# Patient Record
Sex: FEMALE | Race: WHITE | NOT HISPANIC OR LATINO
[De-identification: names, ages, dates, MRNs, and addresses within clinical notes are randomized per-mention and may not be internally consistent; named-entity substitution may affect disease eponyms.]

---

## 2020-11-25 PROBLEM — Z00.00 ENCOUNTER FOR PREVENTIVE HEALTH EXAMINATION: Status: ACTIVE | Noted: 2020-11-25

## 2020-11-29 DIAGNOSIS — Z86.79 PERSONAL HISTORY OF OTHER DISEASES OF THE CIRCULATORY SYSTEM: ICD-10-CM

## 2020-11-29 DIAGNOSIS — I50.9 HEART FAILURE, UNSPECIFIED: ICD-10-CM

## 2020-11-29 DIAGNOSIS — I83.93 ASYMPTOMATIC VARICOSE VEINS OF BILATERAL LOWER EXTREMITIES: ICD-10-CM

## 2020-11-29 DIAGNOSIS — Z86.39 PERSONAL HISTORY OF OTHER ENDOCRINE, NUTRITIONAL AND METABOLIC DISEASE: ICD-10-CM

## 2020-11-29 DIAGNOSIS — I49.9 CARDIAC ARRHYTHMIA, UNSPECIFIED: ICD-10-CM

## 2020-11-29 DIAGNOSIS — Z83.3 FAMILY HISTORY OF DIABETES MELLITUS: ICD-10-CM

## 2020-11-29 DIAGNOSIS — I35.9 NONRHEUMATIC AORTIC VALVE DISORDER, UNSPECIFIED: ICD-10-CM

## 2020-12-01 ENCOUNTER — APPOINTMENT (OUTPATIENT)
Dept: CARDIOTHORACIC SURGERY | Facility: CLINIC | Age: 85
End: 2020-12-01
Payer: MEDICARE

## 2020-12-01 ENCOUNTER — NON-APPOINTMENT (OUTPATIENT)
Age: 85
End: 2020-12-01

## 2020-12-01 VITALS
RESPIRATION RATE: 15 BRPM | OXYGEN SATURATION: 95 % | SYSTOLIC BLOOD PRESSURE: 109 MMHG | BODY MASS INDEX: 36.07 KG/M2 | HEIGHT: 61.81 IN | DIASTOLIC BLOOD PRESSURE: 68 MMHG | TEMPERATURE: 98 F | WEIGHT: 195.99 LBS

## 2020-12-01 DIAGNOSIS — R06.02 SHORTNESS OF BREATH: ICD-10-CM

## 2020-12-01 DIAGNOSIS — R73.03 PREDIABETES.: ICD-10-CM

## 2020-12-01 DIAGNOSIS — Z63.4 DISAPPEARANCE AND DEATH OF FAMILY MEMBER: ICD-10-CM

## 2020-12-01 DIAGNOSIS — R53.83 OTHER FATIGUE: ICD-10-CM

## 2020-12-01 PROCEDURE — 99072 ADDL SUPL MATRL&STAF TM PHE: CPT

## 2020-12-01 PROCEDURE — 99205 OFFICE O/P NEW HI 60 MIN: CPT

## 2020-12-01 PROCEDURE — 93000 ELECTROCARDIOGRAM COMPLETE: CPT

## 2020-12-01 SDOH — SOCIAL STABILITY - SOCIAL INSECURITY: DISSAPEARANCE AND DEATH OF FAMILY MEMBER: Z63.4

## 2020-12-01 NOTE — HISTORY OF PRESENT ILLNESS
[FreeTextEntry1] : Mrs. Madera is a 89 year old female referred by Dr. Ocampo for evaluation of aortic stenosis. She reports a one year progression of fatigue, exertional dyspnea, LE edema and exertional palpitations which have worsened over the past month (NYHA II). She lives in [facility], but has been in [place] with her daughter for the past year due to [employer] travel restrictions. She has no angina, PND, orthopnea, dizziness, or syncope. Her past medical history includes HTN, T2DM, carotid disease, and CAD s/p PCI to LAD in March 2015. She has chronic LE edema (L>R), but notes it is slightly worse than usual. \par \par She had a recent episode of orthopnea that "didn't last long" but was very concerning to her and her daughter. Her daughter notes that "she gets tired easily" even when walking around the house. Review of her outpatient echocardiogram with Dr Peterson (Director of Structural Heart Echocardiogram) demonstrates severe aortic stenosis, hyperdynamic LV function, NEELIMA 0.9, mGr 57, pGr 91, AoV 4.3, and moderate MR. No prior history of CAD, MI, stents, CVA, or prior cardiac surgery.

## 2020-12-01 NOTE — REVIEW OF SYSTEMS
[Feeling Fatigued] : feeling fatigued [Eyeglasses] : currently wearing eyeglasses [Dyspnea on exertion] : dyspnea during exertion [Lower Ext Edema] : lower extremity edema [Incontinence] : incontinence [Negative] : Heme/Lymph [Fever] : no fever [Chills] : no chills [Shortness Of Breath] : no shortness of breath [Abdominal Pain] : no abdominal pain [Nausea] : no nausea [Heartburn] : no heartburn [Change in Appetite] : no change in appetite [Dizziness] : no dizziness [Confusion] : no confusion was observed [Anxiety] : no anxiety

## 2020-12-01 NOTE — PHYSICAL EXAM
[General Appearance - Well Developed] : well developed [Normal Appearance] : normal appearance [General Appearance - Well Nourished] : well nourished [Normal Conjunctiva] : the conjunctiva exhibited no abnormalities [Normal Oral Mucosa] : normal oral mucosa [Normal Jugular Venous A Waves Present] : normal jugular venous A waves present [Normal Jugular Venous V Waves Present] : normal jugular venous V waves present [No Jugular Venous Painting A Waves] : no jugular venous painting A waves [Normal Rate] : normal [Rhythm Regular] : regular [Nonpitting Edema] : nonpitting edema present [Respiration, Rhythm And Depth] : normal respiratory rhythm and effort [Decreased Breath Sounds] : decreased breath sounds [Bowel Sounds] : normal bowel sounds [Abdomen Soft] : soft [Abnormal Walk] : normal gait [Cyanosis, Localized] : no localized cyanosis [Skin Turgor] : normal skin turgor [] : no rash [Oriented To Time, Place, And Person] : oriented to person, place, and time [Impaired Insight] : insight and judgment were intact [Affect] : the affect was normal [IV] : a grade 4

## 2020-12-01 NOTE — HISTORY OF PRESENT ILLNESS
[FreeTextEntry1] : Tia is a 89 year old female with PMH of anemia, arrhythmia, HTN, ASHD,  carotid stenosis, MR, varicose veins, and aortic valve stenosis. Recent echocardiogram demonstrated AoV 4.4 m/sec, pGr 77 mmHg, mGr 49 mmHg, NEELIMA 0.76 cm². She was referred to valve clinic for consideration for CLAUDIA. She lives with her family, her daughter reports patient became short of breath while lying bed. She is fatigued. \par \par

## 2020-12-01 NOTE — CONSULT LETTER
[FreeTextEntry2] : Neal Ocampo, \par [address]\par [address]\[place] [address] [FreeTextEntry3] : Aman Sanders MD\par \par Cardiovascular & Thoracic Surgery\par Professor\par [facility]\par \par

## 2020-12-01 NOTE — PHYSICAL EXAM
[General Appearance - Alert] : alert [General Appearance - In No Acute Distress] : in no acute distress [Sclera] : the sclera and conjunctiva were normal [PERRL With Normal Accommodation] : pupils were equal in size, round, and reactive to light [Extraocular Movements] : extraocular movements were intact [Outer Ear] : the ears and nose were normal in appearance [Oropharynx] : the oropharynx was normal [Jugular Venous Distention Increased] : there was no jugular-venous distention [Respiration, Rhythm And Depth] : normal respiratory rhythm and effort [Auscultation Breath Sounds / Voice Sounds] : lungs were clear to auscultation bilaterally [III] : a grade 3 [___ +] : bilateral [unfilled]U+ pretibial pitting edema [Rt] : varicose veins of the right leg noted [Lt] : varicose veins of the left leg noted [Examination Of The Chest] : the chest was normal in appearance [Breast Appearance] : normal in appearance [Bowel Sounds] : normal bowel sounds [Abdomen Soft] : soft [Cervical Lymph Nodes Enlarged Posterior Bilaterally] : posterior cervical [Cervical Lymph Nodes Enlarged Anterior Bilaterally] : anterior cervical [No CVA Tenderness] : no ~M costovertebral angle tenderness [Involuntary Movements] : no involuntary movements were seen [Skin Color & Pigmentation] : normal skin color and pigmentation [No Focal Deficits] : no focal deficits [Oriented To Time, Place, And Person] : oriented to person, place, and time [Impaired Insight] : insight and judgment were intact [Right Carotid Bruit] : no bruit heard over the right carotid [Left Carotid Bruit] : no bruit heard over the left carotid

## 2020-12-01 NOTE — DATA REVIEWED
[FreeTextEntry1] : Recent echocardiogram demonstrated AoV 4.4 m/sec, pGr 77 mmHg, mGr 49 mmHg, NEELIMA 0.76 cm².

## 2020-12-01 NOTE — DISCUSSION/SUMMARY
[Severe Aortic Stenosis] : severe aortic stenosis [Deteriorating] : deteriorating [Multidetector Cardiac CT] : multidetector cardiac CT [Coronary Artery Disease] : coronary artery disease [Cardiac Catheterization] : cardiac catheterization [Coronary Angiography] : coronary angiography [None] : none [Possible Stent Placement] : possible stent placement [Patient] : the patient [Family] : the patient's family [FreeTextEntry1] : Mrs Madera has severe aortic stenosis with recent symptomatic progression (NYHA II). I am in agreement with Dr Sanders that she is an appropriate candidate for TAVR consideration. She had concerns regarding her age, but I explained to her and her daughter that she is mentally sharp, active and functional, and living independently with excellent family support, which are all favorable. As such, she will be scheduled for a cardiac structural CT and cardiac catheterization. Once completed, all imaging will be reviewed by the Hear Team and an optimal treatment strategy recommended. I have discussed the potential risks and benefits of TAVR with her and her daughter in detail, including but not limited to death, stroke, the potential need for a PPM, and vascular complication. The goals would be to improve her QOL, functional status, and prolong survival.

## 2020-12-01 NOTE — ASSESSMENT
[FreeTextEntry1] : Tia is a 89 year old female with PMH of anemia, arrhythmia, HTN, ASHD,  carotid stenosis, MR, varicose veins, and aortic valve stenosis. Recent echocardiogram demonstrated AoV 4.4 m/sec, pGr 77 mmHg, mGr 49 mmHg, NEELIMA 0.76 cm². She was referred to valve clinic for consideration for CLAUDIA. She ambulates with a cane and a rollator. Her appetite is fair and her bowel habits are normal. \par \par Critical symptomatic AS this patient is a candidate for a TAVR. Will require catheterization and CTA and then she will be offered a surgery date.

## 2020-12-01 NOTE — REVIEW OF SYSTEMS
[Feeling Tired] : feeling tired [Loss Of Hearing] : hearing loss [Lower Ext Edema] : lower extremity edema [SOB on Exertion] : shortness of breath during exertion [Joint Pain] : joint pain [Joint Swelling] : joint swelling [Negative] : Endocrine [Chest Pain] : no chest pain [Palpitations] : no palpitations [Dizziness] : no dizziness [Fainting] : no fainting

## 2020-12-29 ENCOUNTER — FORM ENCOUNTER (OUTPATIENT)
Age: 85
End: 2020-12-29

## 2020-12-30 ENCOUNTER — OUTPATIENT (OUTPATIENT)
Dept: OUTPATIENT SERVICES | Facility: HOSPITAL | Age: 85
LOS: 1 days | End: 2020-12-30
Payer: MEDICARE

## 2020-12-30 VITALS
TEMPERATURE: 98 F | HEIGHT: 64 IN | SYSTOLIC BLOOD PRESSURE: 169 MMHG | WEIGHT: 169.09 LBS | DIASTOLIC BLOOD PRESSURE: 73 MMHG | HEART RATE: 65 BPM | RESPIRATION RATE: 20 BRPM | OXYGEN SATURATION: 97 %

## 2020-12-30 VITALS
OXYGEN SATURATION: 98 % | DIASTOLIC BLOOD PRESSURE: 65 MMHG | SYSTOLIC BLOOD PRESSURE: 145 MMHG | RESPIRATION RATE: 17 BRPM | HEART RATE: 58 BPM

## 2020-12-30 DIAGNOSIS — I35.9 NONRHEUMATIC AORTIC VALVE DISORDER, UNSPECIFIED: ICD-10-CM

## 2020-12-30 DIAGNOSIS — Z96.60 PRESENCE OF UNSPECIFIED ORTHOPEDIC JOINT IMPLANT: Chronic | ICD-10-CM

## 2020-12-30 LAB
ANION GAP SERPL CALC-SCNC: 12 MMOL/L — SIGNIFICANT CHANGE UP (ref 5–17)
BUN SERPL-MCNC: 54 MG/DL — HIGH (ref 7–23)
CALCIUM SERPL-MCNC: 9.3 MG/DL — SIGNIFICANT CHANGE UP (ref 8.4–10.5)
CHLORIDE SERPL-SCNC: 101 MMOL/L — SIGNIFICANT CHANGE UP (ref 96–108)
CO2 SERPL-SCNC: 26 MMOL/L — SIGNIFICANT CHANGE UP (ref 22–31)
CREAT SERPL-MCNC: 1.46 MG/DL — HIGH (ref 0.5–1.3)
GLUCOSE SERPL-MCNC: 142 MG/DL — HIGH (ref 70–99)
HCT VFR BLD CALC: 30.2 % — LOW (ref 34.5–45)
HGB BLD-MCNC: 9.3 G/DL — LOW (ref 11.5–15.5)
MCHC RBC-ENTMCNC: 28.6 PG — SIGNIFICANT CHANGE UP (ref 27–34)
MCHC RBC-ENTMCNC: 30.8 GM/DL — LOW (ref 32–36)
MCV RBC AUTO: 92.9 FL — SIGNIFICANT CHANGE UP (ref 80–100)
NRBC # BLD: 0 /100 WBCS — SIGNIFICANT CHANGE UP (ref 0–0)
PLATELET # BLD AUTO: 168 K/UL — SIGNIFICANT CHANGE UP (ref 150–400)
POTASSIUM SERPL-MCNC: 4.5 MMOL/L — SIGNIFICANT CHANGE UP (ref 3.5–5.3)
POTASSIUM SERPL-SCNC: 4.5 MMOL/L — SIGNIFICANT CHANGE UP (ref 3.5–5.3)
RBC # BLD: 3.25 M/UL — LOW (ref 3.8–5.2)
RBC # FLD: 12.4 % — SIGNIFICANT CHANGE UP (ref 10.3–14.5)
SODIUM SERPL-SCNC: 139 MMOL/L — SIGNIFICANT CHANGE UP (ref 135–145)
WBC # BLD: 5.45 K/UL — SIGNIFICANT CHANGE UP (ref 3.8–10.5)
WBC # FLD AUTO: 5.45 K/UL — SIGNIFICANT CHANGE UP (ref 3.8–10.5)

## 2020-12-30 PROCEDURE — 93005 ELECTROCARDIOGRAM TRACING: CPT

## 2020-12-30 PROCEDURE — 99152 MOD SED SAME PHYS/QHP 5/>YRS: CPT

## 2020-12-30 PROCEDURE — C1887: CPT

## 2020-12-30 PROCEDURE — 93456 R HRT CORONARY ARTERY ANGIO: CPT

## 2020-12-30 PROCEDURE — 93456 R HRT CORONARY ARTERY ANGIO: CPT | Mod: 26

## 2020-12-30 PROCEDURE — C1769: CPT

## 2020-12-30 PROCEDURE — 80048 BASIC METABOLIC PNL TOTAL CA: CPT

## 2020-12-30 PROCEDURE — 93010 ELECTROCARDIOGRAM REPORT: CPT

## 2020-12-30 PROCEDURE — C1894: CPT

## 2020-12-30 PROCEDURE — 85027 COMPLETE CBC AUTOMATED: CPT

## 2020-12-30 PROCEDURE — 99153 MOD SED SAME PHYS/QHP EA: CPT

## 2020-12-30 RX ORDER — METOPROLOL TARTRATE 50 MG
1 TABLET ORAL
Qty: 30 | Refills: 0
Start: 2020-12-30 | End: 2021-01-28

## 2020-12-30 RX ORDER — MONTELUKAST 4 MG/1
1 TABLET, CHEWABLE ORAL
Qty: 30 | Refills: 0
Start: 2020-12-30 | End: 2021-01-28

## 2020-12-30 RX ORDER — SODIUM CHLORIDE 9 MG/ML
3 INJECTION INTRAMUSCULAR; INTRAVENOUS; SUBCUTANEOUS EVERY 8 HOURS
Refills: 0 | Status: DISCONTINUED | OUTPATIENT
Start: 2020-12-30 | End: 2021-01-13

## 2020-12-30 NOTE — ASU DISCHARGE PLAN (ADULT/PEDIATRIC) - CARE PROVIDER_API CALL
Merlin Ulrich J  INTERVENTIONAL CARDIOLOGY  [address] [place] [address]  Phone: [phone]  Fax: [phone]  Follow Up Time: 2 weeks

## 2020-12-30 NOTE — ASU DISCHARGE PLAN (ADULT/PEDIATRIC) - ASU DC SPECIAL INSTRUCTIONSFT
Wound Care:   the day AFTER your procedure remove bandage GENTTLY, and clean using  mild soap and gentle warm, water stream, pat dry. leave OPEN to air. YOU MAY SHOWER   DO NOT apply lotions, creams, ointments, powder, parfumes to your incision site  DO NOT SOAK your site for 1 week ( no baths, no pools, no tubs, etc...)  Check  your groin and /or wirst daily.A small amount of bruising, and soarness are normal    ACTIVITY: for 24 hours   - DO NOT DRIVE  - DO NOT make any important decisions or sign legal documents   - DO NOT operate heavy machinaries   - you may resume sexual activity in 48 hours, unless otherwise instructed by your cardiologist     If your procedure was done through the WRIST: for the NEXT 3DAYS:  - avoid pushing, pulling, with that affected wrist   - avoid repeated movement of that hand and wrist ( eg: typing, hammering)  - DO NOT LIFT anything more than 5 lbs     If your procedure was done through the GROIN: for the NEXT 5 DAYS  - Limit climbing stairs, DO NOT soak in bathtub or pool  - no strenous activities, pushing, pulling, straining  - Do not lift anything 10lbs or heavier     MEDICATION:   take your medications as explained ( see discharge paperwork)   If you received a STENT, you will be taking antiplatelet medications to KEEP YOUR STENT OPEN ( eg: Aspirin, Plavix, Brilinta, Effient, etc).  Take as prescribed DO NOT STOP taking them without consulting with your cardiologist first.     Follow heart healthy diet reccomended by your doctor, , if you smoke STOP SMOKING ( may call [phone] for center of tobacco control if you need assistance)     CALL your doctor to make appointment in 2 WEEKS     ***CALL YOUR DOCTOR***  if you experience: fever, chills, body aches, or severe pain, swelling, redness, heat or yellow discharge at incision site  If you experience Bleeding or excruciating pain at the procedural site, sweliing ( golf ball size) at your procedural site  If you experience CHEST PAIN  If you experience extremity numbness, tingling, temperature change ( of your procedural site)   If you are unable to reach your doctor, you may contact:   -Cardiology Office at [facility] at [phone] or   - [facility] [phone] - [facility] [phone]

## 2021-01-06 ENCOUNTER — RESULT REVIEW (OUTPATIENT)
Age: 86
End: 2021-01-06

## 2021-01-06 ENCOUNTER — APPOINTMENT (OUTPATIENT)
Dept: CARDIOLOGY | Facility: CLINIC | Age: 86
End: 2021-01-06

## 2021-01-06 ENCOUNTER — OUTPATIENT (OUTPATIENT)
Dept: OUTPATIENT SERVICES | Facility: HOSPITAL | Age: 86
LOS: 1 days | End: 2021-01-06
Payer: MEDICARE

## 2021-01-06 DIAGNOSIS — Z96.60 PRESENCE OF UNSPECIFIED ORTHOPEDIC JOINT IMPLANT: Chronic | ICD-10-CM

## 2021-01-06 DIAGNOSIS — I35.9 NONRHEUMATIC AORTIC VALVE DISORDER, UNSPECIFIED: ICD-10-CM

## 2021-01-06 PROCEDURE — 75572 CT HRT W/3D IMAGE: CPT | Mod: 26

## 2021-01-06 PROCEDURE — 75572 CT HRT W/3D IMAGE: CPT

## 2021-01-12 ENCOUNTER — NON-APPOINTMENT (OUTPATIENT)
Age: 86
End: 2021-01-12

## 2021-01-22 ENCOUNTER — NON-APPOINTMENT (OUTPATIENT)
Age: 86
End: 2021-01-22

## 2021-01-22 DIAGNOSIS — Z22.321 CARRIER OR SUSPECTED CARRIER OF METHICILLIN SUSCEPTIBLE STAPHYLOCOCCUS AUREUS: ICD-10-CM

## 2021-01-22 DIAGNOSIS — Z22.322 CARRIER OR SUSPECTED CARRIER OF METHICILLIN RESISTANT STAPHYLOCOCCUS AUREUS: ICD-10-CM

## 2021-01-25 ENCOUNTER — RESULT REVIEW (OUTPATIENT)
Age: 86
End: 2021-01-25

## 2021-01-25 ENCOUNTER — OUTPATIENT (OUTPATIENT)
Dept: OUTPATIENT SERVICES | Facility: HOSPITAL | Age: 86
LOS: 1 days | End: 2021-01-25
Payer: MEDICARE

## 2021-01-25 ENCOUNTER — APPOINTMENT (OUTPATIENT)
Dept: ULTRASOUND IMAGING | Facility: HOSPITAL | Age: 86
End: 2021-01-25

## 2021-01-25 VITALS
DIASTOLIC BLOOD PRESSURE: 75 MMHG | OXYGEN SATURATION: 97 % | HEIGHT: 64 IN | HEART RATE: 64 BPM | SYSTOLIC BLOOD PRESSURE: 149 MMHG | WEIGHT: 195.11 LBS | RESPIRATION RATE: 16 BRPM | TEMPERATURE: 98 F

## 2021-01-25 DIAGNOSIS — I35.0 NONRHEUMATIC AORTIC (VALVE) STENOSIS: ICD-10-CM

## 2021-01-25 DIAGNOSIS — Z79.82 LONG TERM (CURRENT) USE OF ASPIRIN: ICD-10-CM

## 2021-01-25 DIAGNOSIS — Z96.60 PRESENCE OF UNSPECIFIED ORTHOPEDIC JOINT IMPLANT: Chronic | ICD-10-CM

## 2021-01-25 DIAGNOSIS — Z11.52 ENCOUNTER FOR SCREENING FOR COVID-19: ICD-10-CM

## 2021-01-25 DIAGNOSIS — Z90.49 ACQUIRED ABSENCE OF OTHER SPECIFIED PARTS OF DIGESTIVE TRACT: Chronic | ICD-10-CM

## 2021-01-25 DIAGNOSIS — Z01.818 ENCOUNTER FOR OTHER PREPROCEDURAL EXAMINATION: ICD-10-CM

## 2021-01-25 DIAGNOSIS — Z29.9 ENCOUNTER FOR PROPHYLACTIC MEASURES, UNSPECIFIED: ICD-10-CM

## 2021-01-25 LAB
A1C WITH ESTIMATED AVERAGE GLUCOSE RESULT: 7 % — HIGH (ref 4–5.6)
ALBUMIN SERPL ELPH-MCNC: 3.9 G/DL — SIGNIFICANT CHANGE UP (ref 3.3–5)
ALP SERPL-CCNC: 66 U/L — SIGNIFICANT CHANGE UP (ref 40–120)
ALT FLD-CCNC: 15 U/L — SIGNIFICANT CHANGE UP (ref 10–45)
ANION GAP SERPL CALC-SCNC: 11 MMOL/L — SIGNIFICANT CHANGE UP (ref 5–17)
ANION GAP SERPL CALC-SCNC: 12 MMOL/L
AST SERPL-CCNC: 17 U/L — SIGNIFICANT CHANGE UP (ref 10–40)
BILIRUB SERPL-MCNC: 0.4 MG/DL — SIGNIFICANT CHANGE UP (ref 0.2–1.2)
BLD GP AB SCN SERPL QL: NEGATIVE — SIGNIFICANT CHANGE UP
BUN SERPL-MCNC: 44 MG/DL
BUN SERPL-MCNC: 52 MG/DL — HIGH (ref 7–23)
CALCIUM SERPL-MCNC: 9.4 MG/DL
CALCIUM SERPL-MCNC: 9.5 MG/DL — SIGNIFICANT CHANGE UP (ref 8.4–10.5)
CHLORIDE SERPL-SCNC: 98 MMOL/L
CHLORIDE SERPL-SCNC: 99 MMOL/L — SIGNIFICANT CHANGE UP (ref 96–108)
CO2 SERPL-SCNC: 26 MMOL/L — SIGNIFICANT CHANGE UP (ref 22–31)
CO2 SERPL-SCNC: 28 MMOL/L
CREAT SERPL-MCNC: 1.4 MG/DL — HIGH (ref 0.5–1.3)
CREAT SERPL-MCNC: 1.46 MG/DL
ESTIMATED AVERAGE GLUCOSE: 154 MG/DL — HIGH (ref 68–114)
GLUCOSE SERPL-MCNC: 137 MG/DL
GLUCOSE SERPL-MCNC: 157 MG/DL — HIGH (ref 70–99)
HCT VFR BLD CALC: 28.9 % — LOW (ref 34.5–45)
HGB BLD-MCNC: 8.9 G/DL — LOW (ref 11.5–15.5)
MCHC RBC-ENTMCNC: 28.3 PG — SIGNIFICANT CHANGE UP (ref 27–34)
MCHC RBC-ENTMCNC: 30.8 GM/DL — LOW (ref 32–36)
MCV RBC AUTO: 92 FL — SIGNIFICANT CHANGE UP (ref 80–100)
MRSA PCR RESULT.: SIGNIFICANT CHANGE UP
NRBC # BLD: 0 /100 WBCS — SIGNIFICANT CHANGE UP (ref 0–0)
PLATELET # BLD AUTO: 185 K/UL — SIGNIFICANT CHANGE UP (ref 150–400)
POTASSIUM SERPL-MCNC: 4.3 MMOL/L — SIGNIFICANT CHANGE UP (ref 3.5–5.3)
POTASSIUM SERPL-SCNC: 4.3 MMOL/L — SIGNIFICANT CHANGE UP (ref 3.5–5.3)
POTASSIUM SERPL-SCNC: 4.9 MMOL/L
PROT SERPL-MCNC: 6.7 G/DL — SIGNIFICANT CHANGE UP (ref 6–8.3)
RBC # BLD: 3.14 M/UL — LOW (ref 3.8–5.2)
RBC # FLD: 12.3 % — SIGNIFICANT CHANGE UP (ref 10.3–14.5)
RH IG SCN BLD-IMP: POSITIVE — SIGNIFICANT CHANGE UP
S AUREUS DNA NOSE QL NAA+PROBE: SIGNIFICANT CHANGE UP
SARS-COV-2 RNA SPEC QL NAA+PROBE: SIGNIFICANT CHANGE UP
SODIUM SERPL-SCNC: 136 MMOL/L — SIGNIFICANT CHANGE UP (ref 135–145)
SODIUM SERPL-SCNC: 137 MMOL/L
WBC # BLD: 5.9 K/UL — SIGNIFICANT CHANGE UP (ref 3.8–10.5)
WBC # FLD AUTO: 5.9 K/UL — SIGNIFICANT CHANGE UP (ref 3.8–10.5)

## 2021-01-25 PROCEDURE — G0463: CPT

## 2021-01-25 PROCEDURE — C9803: CPT

## 2021-01-25 PROCEDURE — 87641 MR-STAPH DNA AMP PROBE: CPT

## 2021-01-25 PROCEDURE — 86901 BLOOD TYPING SEROLOGIC RH(D): CPT

## 2021-01-25 PROCEDURE — U0003: CPT

## 2021-01-25 PROCEDURE — 86900 BLOOD TYPING SEROLOGIC ABO: CPT

## 2021-01-25 PROCEDURE — 87640 STAPH A DNA AMP PROBE: CPT

## 2021-01-25 PROCEDURE — 80053 COMPREHEN METABOLIC PANEL: CPT

## 2021-01-25 PROCEDURE — 85027 COMPLETE CBC AUTOMATED: CPT

## 2021-01-25 PROCEDURE — U0005: CPT

## 2021-01-25 PROCEDURE — 86850 RBC ANTIBODY SCREEN: CPT

## 2021-01-25 PROCEDURE — 83036 HEMOGLOBIN GLYCOSYLATED A1C: CPT

## 2021-01-25 PROCEDURE — 93880 EXTRACRANIAL BILAT STUDY: CPT

## 2021-01-25 PROCEDURE — 93880 EXTRACRANIAL BILAT STUDY: CPT | Mod: 26

## 2021-01-25 NOTE — H&P PST ADULT - NSICDXPASTMEDICALHX_GEN_ALL_CORE_FT
PAST MEDICAL HISTORY:  Aortic stenosis     Carotid stenosis     Hypertension     Mitral regurgitation      PAST MEDICAL HISTORY:  Aortic stenosis severe    Carotid stenosis     Glaucoma     History of varicose veins of lower extremity     HLD (hyperlipidemia)     Hypertension     Mitral regurgitation

## 2021-01-25 NOTE — H&P PST ADULT - CARDIOVASCULAR SYMPTOMS
occasional/dyspnea on exertion/peripheral edema dyspnea with walking around house/dyspnea on exertion/peripheral edema

## 2021-01-25 NOTE — H&P PST ADULT - NSICDXPROBLEM_GEN_ALL_CORE_FT
PROBLEM DIAGNOSES  Problem: Severe aortic stenosis  Assessment and Plan: TAVR  Carotid Dopplers done 1/25/21  Per CT Surgery - no need for CXR  Stat FS on admsision    Problem: Long term current use of aspirin  Assessment and Plan: Pt will continue aspirin to OR    Problem: Need for prophylactic measure  Assessment and Plan: The Caprini score indicates that this patient is at high risk for a VTE event (score 6 or greater). Surgical patients in this group will benefit from both pharmacologic prophylaxis and intermittent compression devices.  The surgical team will determine the balance between VTE risk and bleeding risk, and other clinical considerations

## 2021-01-25 NOTE — H&P PST ADULT - HISTORY OF PRESENT ILLNESS
89 yo female with h/o anemia, arrythmia, HTN, HLD, ASHD, carotid stenosis, MR and severe aortic stenosis with c/o progressively worsening dyspnea on exertion is scheduled for TAVR on 1/27/2021.    Pt is scheduled for Covid-19 PCR on 1/25/21 at the [facility].

## 2021-01-25 NOTE — H&P PST ADULT - ASSESSMENT
Aortic Stenosis  Aspirin, Long Term Use  CAPRINI VTE 2.0 SCORE [CLOT updated 2019]    AGE RELATED RISK FACTORS                                                       MOBILITY RELATED FACTORS  [ ] Age 41-60 years                                            (1 Point)                    [ ] Bed rest                                                        (1 Point)  [ ] Age: 61-74 years                                           (2 Points)                  [ ] Plaster cast                                                   (2 Points)  [x ] Age= 75 years                                              (3 Points)                    [ ] Bed bound for more than 72 hours                 (2 Points)    DISEASE RELATED RISK FACTORS                                               GENDER SPECIFIC FACTORS  [x ] Edema in the lower extremities                       (1 Point)              [ ] Pregnancy                                                     (1 Point)  [x ] Varicose veins                                               (1 Point)                     [ ] Post-partum < 6 weeks                                   (1 Point)             [ x] BMI > 25 Kg/m2                                            (1 Point)                     [ ] Hormonal therapy  or oral contraception          (1 Point)                 [ ] Sepsis (in the previous month)                        (1 Point)               [ ] History of pregnancy complications                 (1 point)  [ ] Pneumonia or serious lung disease                                               [ ] Unexplained or recurrent                     (1 Point)           (in the previous month)                               (1 Point)  [ ] Abnormal pulmonary function test                     (1 Point)                 SURGERY RELATED RISK FACTORS  [ ] Acute myocardial infarction                              (1 Point)               [ ]  Section                                             (1 Point)  [ ] Congestive heart failure (in the previous month)  (1 Point)      [ ] Minor surgery                                                  (1 Point)   [ ] Inflammatory bowel disease                             (1 Point)               [ ] Arthroscopic surgery                                        (2 Points)  [ ] Central venous access                                      (2 Points)                [x ] General surgery lasting more than 45 minutes (2 points)  [ ] Malignancy- Present or previous                   (2 Points)                [ ] Elective arthroplasty                                         (5 points)    [ ] Stroke (in the previous month)                          (5 Points)                                                                                                                                                           HEMATOLOGY RELATED FACTORS                                                 TRAUMA RELATED RISK FACTORS  [ ] Prior episodes of VTE                                     (3 Points)                [ ] Fracture of the hip, pelvis, or leg                       (5 Points)  [ ] Positive family history for VTE                         (3 Points)             [ ] Acute spinal cord injury (in the previous month)  (5 Points)  [ ] Prothrombin 91723 A                                     (3 Points)               [ ] Paralysis  (less than 1 month)                             (5 Points)  [ ] Factor V Leiden                                             (3 Points)                  [ ] Multiple Trauma within 1 month                        (5 Points)  [ ] Lupus anticoagulants                                     (3 Points)                                                           [ ] Anticardiolipin antibodies                               (3 Points)                                                       [ ] High homocysteine in the blood                      (3 Points)                                             [ ] Other congenital or acquired thrombophilia      (3 Points)                                                [ ] Heparin induced thrombocytopenia                  (3 Points)                                     Total Score [        8  ]

## 2021-01-27 ENCOUNTER — APPOINTMENT (OUTPATIENT)
Dept: CARDIOTHORACIC SURGERY | Facility: HOSPITAL | Age: 86
End: 2021-01-27

## 2021-01-27 ENCOUNTER — INPATIENT (INPATIENT)
Facility: HOSPITAL | Age: 86
LOS: 5 days | Discharge: HOME | DRG: 267 | End: 2021-02-02
Attending: THORACIC SURGERY (CARDIOTHORACIC VASCULAR SURGERY) | Admitting: THORACIC SURGERY (CARDIOTHORACIC VASCULAR SURGERY)
Payer: MEDICARE

## 2021-01-27 VITALS
DIASTOLIC BLOOD PRESSURE: 72 MMHG | HEIGHT: 64 IN | RESPIRATION RATE: 18 BRPM | HEART RATE: 61 BPM | TEMPERATURE: 98 F | SYSTOLIC BLOOD PRESSURE: 147 MMHG | OXYGEN SATURATION: 100 % | WEIGHT: 196.87 LBS

## 2021-01-27 DIAGNOSIS — I35.0 NONRHEUMATIC AORTIC (VALVE) STENOSIS: ICD-10-CM

## 2021-01-27 DIAGNOSIS — Z90.49 ACQUIRED ABSENCE OF OTHER SPECIFIED PARTS OF DIGESTIVE TRACT: Chronic | ICD-10-CM

## 2021-01-27 DIAGNOSIS — Z96.60 PRESENCE OF UNSPECIFIED ORTHOPEDIC JOINT IMPLANT: Chronic | ICD-10-CM

## 2021-01-27 DIAGNOSIS — Z95.2 PRESENCE OF PROSTHETIC HEART VALVE: ICD-10-CM

## 2021-01-27 PROBLEM — E78.5 HYPERLIPIDEMIA, UNSPECIFIED: Chronic | Status: ACTIVE | Noted: 2021-01-25

## 2021-01-27 PROBLEM — Z86.79 PERSONAL HISTORY OF OTHER DISEASES OF THE CIRCULATORY SYSTEM: Chronic | Status: ACTIVE | Noted: 2021-01-25

## 2021-01-27 PROBLEM — H40.9 UNSPECIFIED GLAUCOMA: Chronic | Status: ACTIVE | Noted: 2021-01-25

## 2021-01-27 LAB
APTT BLD: 30.2 SEC — SIGNIFICANT CHANGE UP (ref 27.5–35.5)
BASOPHILS # BLD AUTO: 0.02 K/UL — SIGNIFICANT CHANGE UP (ref 0–0.2)
BASOPHILS NFR BLD AUTO: 0.3 % — SIGNIFICANT CHANGE UP (ref 0–2)
EOSINOPHIL # BLD AUTO: 0.36 K/UL — SIGNIFICANT CHANGE UP (ref 0–0.5)
EOSINOPHIL NFR BLD AUTO: 4.9 % — SIGNIFICANT CHANGE UP (ref 0–6)
GLUCOSE BLDC GLUCOMTR-MCNC: 125 MG/DL — HIGH (ref 70–99)
HCT VFR BLD CALC: 26.6 % — LOW (ref 34.5–45)
HGB BLD-MCNC: 8.5 G/DL — LOW (ref 11.5–15.5)
IMM GRANULOCYTES NFR BLD AUTO: 0.8 % — SIGNIFICANT CHANGE UP (ref 0–1.5)
INR BLD: 1.25 RATIO — HIGH (ref 0.88–1.16)
LYMPHOCYTES # BLD AUTO: 1.13 K/UL — SIGNIFICANT CHANGE UP (ref 1–3.3)
LYMPHOCYTES # BLD AUTO: 15.2 % — SIGNIFICANT CHANGE UP (ref 13–44)
MCHC RBC-ENTMCNC: 28.9 PG — SIGNIFICANT CHANGE UP (ref 27–34)
MCHC RBC-ENTMCNC: 32 GM/DL — SIGNIFICANT CHANGE UP (ref 32–36)
MCV RBC AUTO: 90.5 FL — SIGNIFICANT CHANGE UP (ref 80–100)
MONOCYTES # BLD AUTO: 0.84 K/UL — SIGNIFICANT CHANGE UP (ref 0–0.9)
MONOCYTES NFR BLD AUTO: 11.3 % — SIGNIFICANT CHANGE UP (ref 2–14)
NEUTROPHILS # BLD AUTO: 5.01 K/UL — SIGNIFICANT CHANGE UP (ref 1.8–7.4)
NEUTROPHILS NFR BLD AUTO: 67.5 % — SIGNIFICANT CHANGE UP (ref 43–77)
NRBC # BLD: 0 /100 WBCS — SIGNIFICANT CHANGE UP (ref 0–0)
PLATELET # BLD AUTO: 142 K/UL — LOW (ref 150–400)
PROTHROM AB SERPL-ACNC: 14.8 SEC — HIGH (ref 10.6–13.6)
RBC # BLD: 2.94 M/UL — LOW (ref 3.8–5.2)
RBC # FLD: 12.2 % — SIGNIFICANT CHANGE UP (ref 10.3–14.5)
RH IG SCN BLD-IMP: POSITIVE — SIGNIFICANT CHANGE UP
WBC # BLD: 7.42 K/UL — SIGNIFICANT CHANGE UP (ref 3.8–10.5)
WBC # FLD AUTO: 7.42 K/UL — SIGNIFICANT CHANGE UP (ref 3.8–10.5)

## 2021-01-27 PROCEDURE — 93010 ELECTROCARDIOGRAM REPORT: CPT | Mod: 76

## 2021-01-27 PROCEDURE — 33361 REPLACE AORTIC VALVE PERQ: CPT | Mod: 62,Q0

## 2021-01-27 PROCEDURE — 93306 TTE W/DOPPLER COMPLETE: CPT | Mod: 26,59

## 2021-01-27 PROCEDURE — 99232 SBSQ HOSP IP/OBS MODERATE 35: CPT

## 2021-01-27 RX ORDER — SODIUM CHLORIDE 9 MG/ML
3 INJECTION INTRAMUSCULAR; INTRAVENOUS; SUBCUTANEOUS EVERY 8 HOURS
Refills: 0 | Status: DISCONTINUED | OUTPATIENT
Start: 2021-01-27 | End: 2021-01-27

## 2021-01-27 RX ORDER — LIDOCAINE HCL 20 MG/ML
0.2 VIAL (ML) INJECTION ONCE
Refills: 0 | Status: DISCONTINUED | OUTPATIENT
Start: 2021-01-27 | End: 2021-01-27

## 2021-01-27 RX ORDER — ASPIRIN/CALCIUM CARB/MAGNESIUM 324 MG
81 TABLET ORAL DAILY
Refills: 0 | Status: DISCONTINUED | OUTPATIENT
Start: 2021-01-27 | End: 2021-02-02

## 2021-01-27 RX ORDER — ATORVASTATIN CALCIUM 80 MG/1
20 TABLET, FILM COATED ORAL AT BEDTIME
Refills: 0 | Status: DISCONTINUED | OUTPATIENT
Start: 2021-01-27 | End: 2021-02-02

## 2021-01-27 RX ORDER — LATANOPROST 0.05 MG/ML
1 SOLUTION/ DROPS OPHTHALMIC; TOPICAL AT BEDTIME
Refills: 0 | Status: DISCONTINUED | OUTPATIENT
Start: 2021-01-27 | End: 2021-02-02

## 2021-01-27 RX ORDER — CHLORHEXIDINE GLUCONATE 213 G/1000ML
1 SOLUTION TOPICAL ONCE
Refills: 0 | Status: DISCONTINUED | OUTPATIENT
Start: 2021-01-27 | End: 2021-01-27

## 2021-01-27 RX ORDER — CEFUROXIME AXETIL 250 MG
1500 TABLET ORAL EVERY 8 HOURS
Refills: 0 | Status: COMPLETED | OUTPATIENT
Start: 2021-01-27 | End: 2021-01-28

## 2021-01-27 RX ORDER — CEFUROXIME AXETIL 250 MG
1500 TABLET ORAL ONCE
Refills: 0 | Status: COMPLETED | OUTPATIENT
Start: 2021-01-27 | End: 2021-01-27

## 2021-01-27 RX ADMIN — Medication 100 MILLIGRAM(S): at 22:38

## 2021-01-27 RX ADMIN — ATORVASTATIN CALCIUM 20 MILLIGRAM(S): 80 TABLET, FILM COATED ORAL at 21:38

## 2021-01-27 RX ADMIN — Medication 81 MILLIGRAM(S): at 21:38

## 2021-01-27 NOTE — PROGRESS NOTE ADULT - ASSESSMENT
89 y/o female with h/o anemia, arrythmia, HTN, HLD, ASHD, carotid stenosis, MR and severe aortic stenosis with c/o progressively worsening dyspnea on exertion is scheduled for TAVR on 1/27/2021.    89 y/o female with h/o anemia, arrythmia, HTN, HLD, ASHD, carotid stenosis, MR and severe aortic stenosis with c/o progressively worsening dyspnea on exertion is scheduled for TAVR on 1/27/2021.       1/27 right transfemoral TAVR 29 mm core valve  NL EF, LBBB at baseline, ECG post op: patient dco into 30-40's, Rate increased to 60 and 20 output at that time now currently SR 50 - 60s, pacer settings changed to rate of 40 & output of 20. Pt now transferred to SDU, TVP to remain in place.

## 2021-01-27 NOTE — PROGRESS NOTE ADULT - SUBJECTIVE AND OBJECTIVE BOX
Subjective: Pt states "Hello" denies any CP or SOB. No acute events overnight.     Telemetry:  SR 60 - 80  Vital Signs Last 24 Hrs  T(C): 36.7 (01-27-21 @ 14:25), Max: 36.7 (01-27-21 @ 14:25)  T(F): 98 (01-27-21 @ 14:25), Max: 98 (01-27-21 @ 14:25)  HR: 60 (01-27-21 @ 18:25) (50 - 70)  BP: 154/74 (01-27-21 @ 18:25) (106/54 - 156/69)  RR: 15 (01-27-21 @ 18:25) (15 - 18)  SpO2: 98% (01-27-21 @ 18:25) (97% - 100%)                                 8.5    7.42  )-----------( 142      ( 27 Jan 2021 14:47 )             26.6       POCT Blood Glucose.: 125 mg/dL (27 Jan 2021 10:29)          PHYSICAL EXAM  Neurology: A&Ox3, NAD  CV : RRR+S1S2  Lungs: Respirations non-labored, B/L BS CTA  Abdomen: Soft, NT/ND, +BSx4Q  : Voiding without difficulty  Extremities: B/L LE no edema, negative calf tenderness, +PP                      Left femoral incision with DSD CDI, no bleeding, no hematoma                   Right femoral incision with DSD CDI, +TVP - EPM VVI 40/20                            MEDICATIONS  aspirin enteric coated 81 milliGRAM(s) Oral daily  atorvastatin 20 milliGRAM(s) Oral at bedtime  cefuroxime  IVPB 1500 milliGRAM(s) IV Intermittent every 8 hours  cefuroxime  IVPB 1500 milliGRAM(s) IV Intermittent once  latanoprost 0.005% Ophthalmic Solution 1 Drop(s) Both EYES at bedtime        Discussed with Cardiothoracic Team at AM rounds.

## 2021-01-27 NOTE — BRIEF OPERATIVE NOTE - NSICDXBRIEFPROCEDURE_GEN_ALL_CORE_FT
PROCEDURES:  TAVR, percutaneous 28-Jan-2021 12:59:06 #29 medtronic core evolut pro plus   TVP in place via right femoral venous sheath Kleber Alvarez

## 2021-01-27 NOTE — PROGRESS NOTE ADULT - PROBLEM SELECTOR PLAN 1
Continue asa 81 daily  Check TTE POD 1  Check daily EKG  Maintain right femoral TVP in place  Increase activity as tolerated  Disposition: Plan for home Fri with LISA

## 2021-01-27 NOTE — CHART NOTE - NSCHARTNOTEFT_GEN_A_CORE
s/p right transfemoral TAVR 29 mm core valve  Dx: Severe AS   nL EF  LBBB at baseline    Site:   Left femoral artery 6Fr angioseal  Right femorl vein with stitch and TVP wires (not pacing)  ECG pending    Meds:  ASA only  stop Dig  stop BB  Abx as ordered s/p right transfemoral TAVR 29 mm core valve  Dx: Severe AS   nL EF  LBBB at baseline    Site:   Left femoral artery 6Fr angioseal  Right femoral vein with stitch and TVP wires   ECG post op: patient doc into 30-40's, Rate increased to 60 and 20 output at that time  Pacing wires to remain in place  ECG: SB 50's, pacing changed to rate of 40 output of 20 (MD Ulrich aware)    Meds:  ASA only  stop Dig  stop BB  Abx as ordered    Dispo:  2 lowery step down

## 2021-01-27 NOTE — PRE-ANESTHESIA EVALUATION ADULT - NSANTHPMHFT_GEN_ALL_CORE
89 yo F with pmhx of CAD s/p stents, AS, HTN, anemia, and HLD who presents for TF TAVR. NPO, COVID neg.

## 2021-01-28 ENCOUNTER — FORM ENCOUNTER (OUTPATIENT)
Age: 86
End: 2021-01-28

## 2021-01-28 DIAGNOSIS — I48.91 UNSPECIFIED ATRIAL FIBRILLATION: ICD-10-CM

## 2021-01-28 LAB
ANION GAP SERPL CALC-SCNC: 11 MMOL/L — SIGNIFICANT CHANGE UP (ref 5–17)
BUN SERPL-MCNC: 45 MG/DL — HIGH (ref 7–23)
CALCIUM SERPL-MCNC: 8.9 MG/DL — SIGNIFICANT CHANGE UP (ref 8.4–10.5)
CHLORIDE SERPL-SCNC: 100 MMOL/L — SIGNIFICANT CHANGE UP (ref 96–108)
CO2 SERPL-SCNC: 25 MMOL/L — SIGNIFICANT CHANGE UP (ref 22–31)
CREAT SERPL-MCNC: 1.27 MG/DL — SIGNIFICANT CHANGE UP (ref 0.5–1.3)
GLUCOSE SERPL-MCNC: 140 MG/DL — HIGH (ref 70–99)
HCT VFR BLD CALC: 28.8 % — LOW (ref 34.5–45)
HGB BLD-MCNC: 9 G/DL — LOW (ref 11.5–15.5)
MCHC RBC-ENTMCNC: 28.2 PG — SIGNIFICANT CHANGE UP (ref 27–34)
MCHC RBC-ENTMCNC: 31.3 GM/DL — LOW (ref 32–36)
MCV RBC AUTO: 90.3 FL — SIGNIFICANT CHANGE UP (ref 80–100)
NRBC # BLD: 0 /100 WBCS — SIGNIFICANT CHANGE UP (ref 0–0)
PLATELET # BLD AUTO: 149 K/UL — LOW (ref 150–400)
POTASSIUM SERPL-MCNC: 4.3 MMOL/L — SIGNIFICANT CHANGE UP (ref 3.5–5.3)
POTASSIUM SERPL-SCNC: 4.3 MMOL/L — SIGNIFICANT CHANGE UP (ref 3.5–5.3)
RBC # BLD: 3.19 M/UL — LOW (ref 3.8–5.2)
RBC # FLD: 12.3 % — SIGNIFICANT CHANGE UP (ref 10.3–14.5)
SODIUM SERPL-SCNC: 136 MMOL/L — SIGNIFICANT CHANGE UP (ref 135–145)
WBC # BLD: 7.94 K/UL — SIGNIFICANT CHANGE UP (ref 3.8–10.5)
WBC # FLD AUTO: 7.94 K/UL — SIGNIFICANT CHANGE UP (ref 3.8–10.5)

## 2021-01-28 PROCEDURE — 93010 ELECTROCARDIOGRAM REPORT: CPT

## 2021-01-28 PROCEDURE — 93306 TTE W/DOPPLER COMPLETE: CPT | Mod: 26

## 2021-01-28 PROCEDURE — 99233 SBSQ HOSP IP/OBS HIGH 50: CPT

## 2021-01-28 PROCEDURE — 99232 SBSQ HOSP IP/OBS MODERATE 35: CPT

## 2021-01-28 RX ORDER — DIGOXIN 250 MCG
0.12 TABLET ORAL DAILY
Refills: 0 | Status: DISCONTINUED | OUTPATIENT
Start: 2021-01-28 | End: 2021-01-29

## 2021-01-28 RX ORDER — FUROSEMIDE 40 MG
20 TABLET ORAL DAILY
Refills: 0 | Status: DISCONTINUED | OUTPATIENT
Start: 2021-01-28 | End: 2021-01-30

## 2021-01-28 RX ORDER — METOPROLOL TARTRATE 50 MG
25 TABLET ORAL DAILY
Refills: 0 | Status: DISCONTINUED | OUTPATIENT
Start: 2021-01-28 | End: 2021-01-29

## 2021-01-28 RX ADMIN — Medication 81 MILLIGRAM(S): at 12:21

## 2021-01-28 RX ADMIN — Medication 20 MILLIGRAM(S): at 13:30

## 2021-01-28 RX ADMIN — Medication 0.12 MILLIGRAM(S): at 08:11

## 2021-01-28 RX ADMIN — Medication 100 MILLIGRAM(S): at 06:02

## 2021-01-28 RX ADMIN — Medication 25 MILLIGRAM(S): at 08:11

## 2021-01-28 RX ADMIN — ATORVASTATIN CALCIUM 20 MILLIGRAM(S): 80 TABLET, FILM COATED ORAL at 21:13

## 2021-01-28 NOTE — PROGRESS NOTE ADULT - PROBLEM SELECTOR PLAN 1
Continue asa 81 daily  Check TTE POD 1  Check daily EKG  Maintain right femoral TVP in place  Increase activity as tolerated  Disposition: Plan for home Fri with LISA Continue asa 81 daily  Check daily EKG right femoral TVP in place     plan to remove today  dig  125 mcg   and toprol 25    Disposition: Plan for home Fri with LISA Continue asa 81 daily  Check daily EKG right femoral TVP in place     plan to remove today  dig  125 mcg   and toprol 25  ,  home dose 50 mg  Disposition: Plan for home Fri with LISA

## 2021-01-28 NOTE — PROGRESS NOTE ADULT - SUBJECTIVE AND OBJECTIVE BOX
VITAL SIGNS    Telemetry:      Vital Signs Last 24 Hrs  T(C): 36.4 (01-28-21 @ 03:08), Max: 36.7 (01-27-21 @ 14:25)  T(F): 97.6 (01-28-21 @ 03:08), Max: 98 (01-27-21 @ 14:25)  HR: 79 (01-28-21 @ 03:08) (50 - 79)  BP: 156/67 (01-28-21 @ 03:08) (106/54 - 170/72)  RR: 18 (01-28-21 @ 03:08) (15 - 18)  SpO2: 100% (01-28-21 @ 03:08) (97% - 100%)                   Daily Height in cm: 162.56 (27 Jan 2021 12:00)    Daily         CAPILLARY BLOOD GLUCOSE      POCT Blood Glucose.: 125 mg/dL (27 Jan 2021 10:29)        Pacing Wires        [  ]   Settings:                                  Isolated      Coumadin    [ ] YES          [  ]      NO         Reason:                         PHYSICAL EXAM    Neurology: alert and oriented x 3, moves all extremities with no defecits  CV :  RRR  Sternal Wound :  CDI , Stable  Lungs:   CTA B/L  Abdomen: soft, nontender, nondistended, positive bowel sounds, last bowel movement   Extremities:                                            VITAL SIGNS    Telemetry:  afib   80    Vital Signs Last 24 Hrs  T(C): 36.4 (01-28-21 @ 03:08), Max: 36.7 (01-27-21 @ 14:25)  T(F): 97.6 (01-28-21 @ 03:08), Max: 98 (01-27-21 @ 14:25)  HR: 79 (01-28-21 @ 03:08) (50 - 79)  BP: 156/67 (01-28-21 @ 03:08) (106/54 - 170/72)  RR: 18 (01-28-21 @ 03:08) (15 - 18)  SpO2: 100% (01-28-21 @ 03:08) (97% - 100%)                   Daily Height in cm: 162.56 (27 Jan 2021 12:00)    Daily         CAPILLARY BLOOD GLUCOSE      POCT Blood Glucose.: 125 mg/dL (27 Jan 2021 10:29)           TVP Pacing Wires        [  ]   Settings:                                      PHYSICAL EXAM    Neurology: alert and oriented x 3, moves all extremities with no defecits  CV :  RRR  Sternal Wound :  CDI , Stable  Lungs:   CTA B/L  Abdomen: soft, nontender, nondistended, positive bowel sounds, last bowel movement   Extremities:                                            VITAL SIGNS    Telemetry:  afib   80    Vital Signs Last 24 Hrs  T(C): 36.4 (01-28-21 @ 03:08), Max: 36.7 (01-27-21 @ 14:25)  T(F): 97.6 (01-28-21 @ 03:08), Max: 98 (01-27-21 @ 14:25)  HR: 79 (01-28-21 @ 03:08) (50 - 79)  BP: 156/67 (01-28-21 @ 03:08) (106/54 - 170/72)  RR: 18 (01-28-21 @ 03:08) (15 - 18)  SpO2: 100% (01-28-21 @ 03:08) (97% - 100%)                   Daily Height in cm: 162.56 (27 Jan 2021 12:00)    Daily         CAPILLARY BLOOD GLUCOSE      POCT Blood Glucose.: 125 mg/dL (27 Jan 2021 10:29)           TVP Pacing Wires        [  ]   Settings:                                      PHYSICAL EXAM    Neurology: alert and oriented x 3, moves all extremities with no defecits  CV : I RRR    Lungs:   CTA B/L  Abdomen: soft, nontender, nondistended,   Extremities:     bl  groins benign,   + TVP

## 2021-01-28 NOTE — PROGRESS NOTE ADULT - ASSESSMENT
89 y/o female with h/o anemia, arrythmia, HTN, HLD, ASHD, carotid stenosis, MR and severe aortic stenosis with c/o progressively worsening dyspnea on exertion is scheduled for TAVR on 1/27/2021.       1/27 right transfemoral TAVR 29 mm core valve  NL EF, LBBB at baseline, ECG post op: patient doc into 30-40's, Rate increased to 60 and 20 output at that time now currently SR 50 - 60s, pacer settings changed to rate of 40 & output of 20. Pt now transferred to SDU, TVP to remain in place.  89 y/o female with h/o anemia, arrythmia, HTN, HLD, ASHD, carotid stenosis, MR and severe aortic stenosis with c/o progressively worsening dyspnea on exertion is scheduled for TAVR on 1/27/2021.       1/27 right transfemoral TAVR 29 mm core valve  NL EF, LBBB at baseline, ECG post op: patient doc into 30-40's, Rate increased to 60 and 20 output at that time now currently SR 50 - 60s, pacer settings changed to rate of 40 & output of 20. Pt now transferred to SDU, TVP to remain in place.   1/28   TVP,  started home dog and toprol 25   qd   now in afib  80   TTE done

## 2021-01-28 NOTE — PROGRESS NOTE ADULT - SUBJECTIVE AND OBJECTIVE BOX
*****Structural Heart Team*****    Subjective:    Patient resting comfortably in bed, stating she is uncomfortable, and would like to get out of bed. The patient still has a femoral TVP wire in for new LBBB, which is resolved, and there has been no pacing overnight. She did have an episode of AFib last night.      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          T(C): 36.4 (01-28-21 @ 07:03), Max: 36.7 (01-27-21 @ 14:25)  HR: 81 (01-28-21 @ 07:03) (50 - 81)  BP: 143/68 (01-28-21 @ 07:03) (106/54 - 170/72)  RR: 18 (01-28-21 @ 07:03) (15 - 18)  SpO2: 99% (01-28-21 @ 07:03) (97% - 100%)  Wt(kg): --  01-27 @ 07:01  -  01-28 @ 07:00  --------------------------------------------------------  IN: 450 mL / OUT: 650 mL / NET: -200 mL      MEDICATIONS  (STANDING):  aspirin enteric coated 81 milliGRAM(s) Oral daily  atorvastatin 20 milliGRAM(s) Oral at bedtime  digoxin     Tablet 0.125 milliGRAM(s) Oral daily  latanoprost 0.005% Ophthalmic Solution 1 Drop(s) Both EYES at bedtime  metoprolol succinate ER 25 milliGRAM(s) Oral daily    MEDICATIONS  (PRN):      Review of Symptoms:  Constitutional: Awake, Alert, Follows commands  Respiratory: Mild SOB  Cardiac: Denies CP, Denies Palpitations  Gastrointestinal: Denies Pain, Denies N/V, tolerating po intake  Vascular: Negative  Extremities: No Edema, No joint pain or swelling  Neurological: Negative  Endocrine: No heat or cold intolerance, No excessive thirst  Heme/Onc: Negative    Exam:  General: A/O x3, LUC, Follows commands  HEENT: Supple, No JVD, Trachea midline, no masses  Pulmonary: CTAB, = Chest Excursion, no accessory muscle use  Cor: S1S2, RRR, No murmur or rub noted  ECG: SR with 1st degree AVB  Groin/Wound: Soft, Mildly tender, no hematoma, R Femoral TVP wire in place  Gastrointestinal: Soft, NT/ND, + Bowel Sounds  Neuro: = motor and sensory B/L, No focal deficits  Vascular: 1+ pulses B/L, Trace edema  Extremities: No joint pain or swelling  Skin: Warm/Dry/Normal color, Normal turgor, no rashes                          9.0    7.94  )-----------( 149      ( 28 Jan 2021 05:29 )             28.8   01-28    136  |  100  |  45<H>  ----------------------------<  140<H>  4.3   |  25  |  1.27    Ca    8.9      28 Jan 2021 05:29    PT/INR - ( 27 Jan 2021 14:47 )   PT: 14.8 sec;   INR: 1.25 ratio         PTT - ( 27 Jan 2021 14:47 )  PTT:30.2 sec    Imaging Reviewed:    Post Op TTE: Pending      Assesment/Plan:  89 female s/p TAVR for severe Aortic Stenosis, Chronic Diastolic Heart Failure, p AF  1.) S/P TAVR: ASA 81 mg po daily, Continue to Monitor Groins. Ambulate as tolerated.  Follow up post op TTE  2.) Patient will be discharged on an MCOT for a period of 30days to monitor for rhythm changes post TAVR.  3.) CChronic HF: Monitor daily weight, restarted furosemide 20 mg po daily,  half of patients usual home dose  4.) pAF: Currently in SR. Rate controlled and back on her Digoxin  5.) Would recommend removing TVP wire, and ambulating patient.  6.) Discharge Plan: Patient is to follow up with Dr. Sanders  in 1 week post discharge. She should then follow up with the [facility]  in 30 days, with a repeat Transthoracic Echo to be done at that visit.    BAL Lester  [id number]

## 2021-01-29 ENCOUNTER — TRANSCRIPTION ENCOUNTER (OUTPATIENT)
Age: 86
End: 2021-01-29

## 2021-01-29 LAB
ANION GAP SERPL CALC-SCNC: 13 MMOL/L — SIGNIFICANT CHANGE UP (ref 5–17)
BUN SERPL-MCNC: 48 MG/DL — HIGH (ref 7–23)
CALCIUM SERPL-MCNC: 9 MG/DL — SIGNIFICANT CHANGE UP (ref 8.4–10.5)
CHLORIDE SERPL-SCNC: 98 MMOL/L — SIGNIFICANT CHANGE UP (ref 96–108)
CO2 SERPL-SCNC: 23 MMOL/L — SIGNIFICANT CHANGE UP (ref 22–31)
CREAT SERPL-MCNC: 1.64 MG/DL — HIGH (ref 0.5–1.3)
GLUCOSE SERPL-MCNC: 128 MG/DL — HIGH (ref 70–99)
HCT VFR BLD CALC: 27.6 % — LOW (ref 34.5–45)
HGB BLD-MCNC: 8.6 G/DL — LOW (ref 11.5–15.5)
MCHC RBC-ENTMCNC: 27.9 PG — SIGNIFICANT CHANGE UP (ref 27–34)
MCHC RBC-ENTMCNC: 31.2 GM/DL — LOW (ref 32–36)
MCV RBC AUTO: 89.6 FL — SIGNIFICANT CHANGE UP (ref 80–100)
NRBC # BLD: 0 /100 WBCS — SIGNIFICANT CHANGE UP (ref 0–0)
PLATELET # BLD AUTO: 129 K/UL — LOW (ref 150–400)
POTASSIUM SERPL-MCNC: 4 MMOL/L — SIGNIFICANT CHANGE UP (ref 3.5–5.3)
POTASSIUM SERPL-SCNC: 4 MMOL/L — SIGNIFICANT CHANGE UP (ref 3.5–5.3)
RBC # BLD: 3.08 M/UL — LOW (ref 3.8–5.2)
RBC # FLD: 12.5 % — SIGNIFICANT CHANGE UP (ref 10.3–14.5)
SARS-COV-2 RNA SPEC QL NAA+PROBE: SIGNIFICANT CHANGE UP
SODIUM SERPL-SCNC: 134 MMOL/L — LOW (ref 135–145)
WBC # BLD: 6.84 K/UL — SIGNIFICANT CHANGE UP (ref 3.8–10.5)
WBC # FLD AUTO: 6.84 K/UL — SIGNIFICANT CHANGE UP (ref 3.8–10.5)

## 2021-01-29 PROCEDURE — 93010 ELECTROCARDIOGRAM REPORT: CPT

## 2021-01-29 PROCEDURE — 99232 SBSQ HOSP IP/OBS MODERATE 35: CPT

## 2021-01-29 PROCEDURE — 99233 SBSQ HOSP IP/OBS HIGH 50: CPT

## 2021-01-29 RX ORDER — VALSARTAN 80 MG/1
80 TABLET ORAL DAILY
Refills: 0 | Status: DISCONTINUED | OUTPATIENT
Start: 2021-01-29 | End: 2021-01-30

## 2021-01-29 RX ORDER — METOPROLOL TARTRATE 50 MG
50 TABLET ORAL DAILY
Refills: 0 | Status: DISCONTINUED | OUTPATIENT
Start: 2021-01-29 | End: 2021-02-02

## 2021-01-29 RX ORDER — MUPIROCIN 20 MG/G
2 OINTMENT TOPICAL
Qty: 1 | Refills: 0 | Status: COMPLETED | COMMUNITY
Start: 2021-01-22 | End: 2021-01-29

## 2021-01-29 RX ADMIN — VALSARTAN 80 MILLIGRAM(S): 80 TABLET ORAL at 12:38

## 2021-01-29 RX ADMIN — LATANOPROST 1 DROP(S): 0.05 SOLUTION/ DROPS OPHTHALMIC; TOPICAL at 22:02

## 2021-01-29 RX ADMIN — ATORVASTATIN CALCIUM 20 MILLIGRAM(S): 80 TABLET, FILM COATED ORAL at 22:02

## 2021-01-29 RX ADMIN — Medication 20 MILLIGRAM(S): at 05:10

## 2021-01-29 RX ADMIN — Medication 0.12 MILLIGRAM(S): at 05:10

## 2021-01-29 RX ADMIN — Medication 81 MILLIGRAM(S): at 10:14

## 2021-01-29 RX ADMIN — Medication 25 MILLIGRAM(S): at 05:10

## 2021-01-29 NOTE — DISCHARGE NOTE PROVIDER - NSDCPNSUBOBJ_GEN_ALL_CORE
im ok    VITAL SIGNS    Telemetry:  nsr 60    Vital Signs Last 24 Hrs  T(C): 36.8 (21 @ 10:56), Max: 36.9 (21 @ 07:33)  T(F): 98.2 (21 @ 10:56), Max: 98.4 (21 @ 07:33)  HR: 60 (21 @ 10:56) (60 - 73)  BP: 131/58 (21 @ 10:56) (112/56 - 155/67)  RR: 18 (21 @ 10:56) (18 - 19)  SpO2: 95% (21 @ 10:56) (94% - 100%)                    @ 07:01  -   @ 07:00  --------------------------------------------------------  IN: 1060 mL / OUT: 2900 mL / NET: -1840 mL     @ 07:01  -  02 @ 11:23  --------------------------------------------------------  IN: 240 mL / OUT: 0 mL / NET: 240 mL          Daily     Daily Weight in k.3 (2021 05:54)            CAPILLARY BLOOD GLUCOSE              :    Coumadin                               PHYSICAL EXAM        Neurology: alert and oriented x 3, nonfocal, no gross deficits  CV : s1 s2 RRR  Lungs: cta  Abdomen: soft, nontender, nondistended, positive bowel sounds                  :    voiding  Extremities:     trace edema   /  -   calve tenderness ,    R groin cdi , small hematoma          acetaminophen   Tablet .. 650 milliGRAM(s) Oral every 6 hours PRN  amLODIPine   Tablet 2.5 milliGRAM(s) Oral daily  aspirin enteric coated 81 milliGRAM(s) Oral daily  atorvastatin 20 milliGRAM(s) Oral at bedtime  ceFAZolin   IVPB 2000 milliGRAM(s) IV Intermittent once  latanoprost 0.005% Ophthalmic Solution 1 Drop(s) Both EYES at bedtime  metoprolol succinate ER 50 milliGRAM(s) Oral daily                    Discussed with Cardiothoracic Team at AM rounds.

## 2021-01-29 NOTE — DISCHARGE NOTE PROVIDER - NSDCMRMEDTOKEN_GEN_ALL_CORE_FT
aspirin 81 mg oral delayed release tablet: 1 tab(s) orally once a day  atorvastatin 20 mg oral tablet: 1 tab(s) orally once a day (at bedtime)  digoxin 125 mcg (0.125 mg) oral tablet: 1 tab(s) orally once a day  Diovan HCT 80 mg-12.5 mg oral tablet: 1 tab(s) orally once a day  fluticasone-salmeterol 100 mcg-50 mcg inhalation powder: 1 puff(s) inhaled 2 times a day  furosemide 40 mg oral tablet: 1 tab(s) orally once a day  metoprolol succinate 50 mg oral tablet, extended release: 1 tab(s) orally once a day (at bedtime)  travoprost 0.004% ophthalmic solution: 1 drop(s) to each affected eye once a day (in the evening)   acetaminophen 325 mg oral tablet: 3 tab(s) orally every 8 hours, As Needed  for pain.  Do not exceed 3 doses /day  amLODIPine 2.5 mg oral tablet: 1 tab(s) orally once a day  aspirin 81 mg oral delayed release tablet: 1 tab(s) orally once a day  atorvastatin 20 mg oral tablet: 1 tab(s) orally once a day (at bedtime)  fluticasone-salmeterol 100 mcg-50 mcg inhalation powder: 1 puff(s) inhaled 2 times a day  Metoprolol Succinate ER 50 mg oral tablet, extended release: 1 tab(s) orally once a day   Singulair 10 mg oral tablet: 1 tab(s) orally once a day   travoprost 0.004% ophthalmic solution: 1 drop(s) to each affected eye once a day (in the evening)

## 2021-01-29 NOTE — DISCHARGE NOTE PROVIDER - CARE PROVIDERS DIRECT ADDRESSES
[email or web address]

## 2021-01-29 NOTE — DISCHARGE NOTE PROVIDER - CARE PROVIDER_API CALL
Aman Sanders (MD)  Thoracic and Cardiac Surgery  [address] [place] [address]  Phone: [phone]  Fax: [phone]  Scheduled Appointment: 02/02/2021 01:15 PM    Merlin Ulrich)  Interventional Cardiology  [address] [place] [address]  Phone: [phone]  Fax: [phone]  Follow Up Time:    Merlin Ulrich)  Interventional Cardiology  [address] [place] [address]  Phone: [phone]  Fax: [phone]  Follow Up Time:     Aman Sanders)  Thoracic and Cardiac Surgery  [address] [place] [address]  Phone: [phone]  Fax: [phone]  Follow Up Time:    Merlin Ulrich)  Interventional Cardiology  [address] [place] [address]  Phone: [phone]  Fax: [phone]  Follow Up Time:     Aman Sanders)  Thoracic and Cardiac Surgery  [address] [place] [address]  Phone: [phone]  Fax: [phone]  Scheduled Appointment: 02/09/2021 08:00 AM

## 2021-01-29 NOTE — DISCHARGE NOTE NURSING/CASE MANAGEMENT/SOCIAL WORK - PATIENT PORTAL LINK FT
You can access the FollowMyHealth Patient Portal offered by [facility] by registering at the following website: [email or web address]. By joining [employer]’s FollowMyHealth portal, you will also be able to view your health information using other applications (apps) compatible with our system.

## 2021-01-29 NOTE — PROGRESS NOTE ADULT - SUBJECTIVE AND OBJECTIVE BOX
*****Structural Heart Team*****    Subjective:  Patient resting comfortably in a chair, offering no complaints. There were no events overnight, no episodes of AFib.      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          T(C): 36.8 (01-29-21 @ 03:12), Max: 36.8 (01-28-21 @ 19:56)  HR: 75 (01-29-21 @ 08:09) (75 - 85)  BP: 143/65 (01-29-21 @ 08:09) (131/62 - 175/68)  RR: 18 (01-29-21 @ 08:09) (18 - 18)  SpO2: 94% (01-29-21 @ 08:09) (94% - 97%)  Wt(kg): --  01-28 @ 07:01 - 01-29 @ 07:00  --------------------------------------------------------  IN: 920 mL / OUT: 850 mL / NET: 70 mL    01-29 @ 07:01 - 01-29 @ 10:59  --------------------------------------------------------  IN: 240 mL / OUT: 0 mL / NET: 240 mL      MEDICATIONS  (STANDING):  aspirin enteric coated 81 milliGRAM(s) Oral daily  atorvastatin 20 milliGRAM(s) Oral at bedtime  digoxin     Tablet 0.125 milliGRAM(s) Oral daily  furosemide    Tablet 20 milliGRAM(s) Oral daily  latanoprost 0.005% Ophthalmic Solution 1 Drop(s) Both EYES at bedtime  metoprolol succinate ER 50 milliGRAM(s) Oral daily  valsartan 80 milliGRAM(s) Oral daily    MEDICATIONS  (PRN):      Review of Symptoms:  Constitutional: Awake, Alert, Follows commands  Respiratory: Currently denies  Cardiac: Denies CP, Denies Palpitations  Gastrointestinal: Denies Pain, Denies N/V, tolerating po intake  Vascular: Negative  Extremities: + Edema, No joint pain or swelling  Neurological: Negative  Endocrine: No heat or cold intolerance, No excessive thirst  Heme/Onc: Negative    Exam:  General: A/Ox3, LUC, Follows commands  HEENT: Supple, No JVD, Trachea midline, no masses  Pulmonary: CTAB, = Chest Excursion, no accessory muscle use  Cor: S1S2, RRR, No murmur or rub noted  ECG: SR with 1st degree AVB and LBBB ( yesterday 176 today)  Groin/Wound: Soft, mild tenderness. Minimal ecchymosis. No hematoma  Gastrointestinal: Soft, NT/ND, + Bowel Sounds  Neuro: = motor and sensory B/L, No focal deficits  Vascular: 1+ Pulses B/L, Trace edema  Extremities: No joint pain or swelling  Skin: Warm/Dry/Normal color, Normal turgor, no rashes                          8.6    6.84  )-----------( 129      ( 29 Jan 2021 04:38 )             27.6   01-29    134<L>  |  98  |  48<H>  ----------------------------<  128<H>  4.0   |  23  |  1.64<H>    Ca    9.0      29 Jan 2021 04:38    PT/INR - ( 27 Jan 2021 14:47 )   PT: 14.8 sec;   INR: 1.25 ratio         PTT - ( 27 Jan 2021 14:47 )  PTT:30.2 sec    Imaging Reviewed:    Post Op TTE:  < from: TTE with Doppler (w/Cont) (01.28.21 @ 08:10) >  EF (Visual Estimate): 75-80 %  Doppler Peak Velocity (m/sec): AoV=2.4  ------------------------------------------------------------------------  Observations:  Mitral Valve: Normal mitral valve. Mild mitral  regurgitation.  Aortic Valve/Aorta: Transcatheter aortic valve replacement.  Peak transaortic valve gradient equals 25 mm Hg, mean  transaortic valve gradient equals 14 mm Hg, which is  probably normal in the presence of a transcatheter aortic  valve replacement. NEELIMA 2.4sqcm Minimal  paravalvular aortic  regurgitation. Peak left ventricular outflow tract gradient  equals 13 mm Hg, mean gradient is equal to 7 mm Hg, LVOT  velocity time integral equals 35 cm.  LVOT diameter: 2 cm.  Left Atrium: Severely dilated left atrium.  LA volume index  = 55 cc/m2.  Left Ventricle: Hyperdynamic left ventricular systolic  function. Moderate concentric left ventricular hypertrophy.  Reversal of the E-A waves of the mitral inflow pattern  consistent with reduced compliance of the left ventricle.  Right Heart: Normal right atrium. Normal right ventricular  size and function. Normal tricuspid valve. Minimal  tricuspid regurgitation. Normal pulmonic valve.  Pericardium/Pleura: Thickened pericardium.  Hemodynamic: Estimated right atrial pressure is 8 mm Hg.  Estimated right ventricular systolic pressure equals 28 mm  Hg, assuming right atrial pressure equals 8 mm Hg,  consistent with normal pulmonary pressures.  ------------------------------------------------------------------------  Conclusions:  1. Transcatheter aortic valve replacement. Peak transaortic  valve gradient equals 25 mm Hg, mean transaortic valve  gradientequals 14 mm Hg, which is probably normal in the  presence of a transcatheter aortic valve replacement. NEELIMA  2.4sqcm Minimal  paravalvular aortic regurgitation.  2. Severely dilated left atrium.  LA volume index = 55  cc/m2.  3. Moderate concentric left ventricular hypertrophy.  4. Hyperdynamic left ventricular systolic function.  5. Normal right ventricular size and function.  6. Thickened pericardium.  *** No previous Echo exam.    < end of copied text >      Assesment/Plan:    89 Female S/P TAVR for Severe Aortic Stenosis, Chronic Diastolic HF, paroxysmal AFib  1.) S/P TAVR: ASA 81 mg po daily, Continue to Monitor Groins. Ambulate as tolerated. Post op TTE looked at, well seated.  2.) Patient will be discharged on an MCOT for a period of 30days to monitor for rhythm changes post TAVR.  3.) Paroxysmal Afib: rate controlled on Digoxin. 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   4.) Ambulate as tolerated  5.) Discharge Plan: Patient is to follow up with Dr. Sanders in 1 week post discharge. She should then follow up with the [facility]  in 30 days, with a repeat Transthoracic Echo to be done at that visit.    BAL Lester  [id number] *****Structural Heart Team*****    Subjective:  Patient resting comfortably in a chair, offering no complaints. There were no events overnight, no episodes of AFib.      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          T(C): 36.8 (01-29-21 @ 03:12), Max: 36.8 (01-28-21 @ 19:56)  HR: 75 (01-29-21 @ 08:09) (75 - 85)  BP: 143/65 (01-29-21 @ 08:09) (131/62 - 175/68)  RR: 18 (01-29-21 @ 08:09) (18 - 18)  SpO2: 94% (01-29-21 @ 08:09) (94% - 97%)  Wt(kg): --  01-28 @ 07:01 - 01-29 @ 07:00  --------------------------------------------------------  IN: 920 mL / OUT: 850 mL / NET: 70 mL    01-29 @ 07:01 - 01-29 @ 10:59  --------------------------------------------------------  IN: 240 mL / OUT: 0 mL / NET: 240 mL      MEDICATIONS  (STANDING):  aspirin enteric coated 81 milliGRAM(s) Oral daily  atorvastatin 20 milliGRAM(s) Oral at bedtime  digoxin     Tablet 0.125 milliGRAM(s) Oral daily  furosemide    Tablet 20 milliGRAM(s) Oral daily  latanoprost 0.005% Ophthalmic Solution 1 Drop(s) Both EYES at bedtime  metoprolol succinate ER 50 milliGRAM(s) Oral daily  valsartan 80 milliGRAM(s) Oral daily    MEDICATIONS  (PRN):      Review of Symptoms:  Constitutional: Awake, Alert, Follows commands  Respiratory: Currently denies  Cardiac: Denies CP, Denies Palpitations  Gastrointestinal: Denies Pain, Denies N/V, tolerating po intake  Vascular: Negative  Extremities: + Edema, No joint pain or swelling  Neurological: Negative  Endocrine: No heat or cold intolerance, No excessive thirst  Heme/Onc: Negative    Exam:  General: A/Ox3, LUC, Follows commands  HEENT: Supple, No JVD, Trachea midline, no masses  Pulmonary: CTAB, = Chest Excursion, no accessory muscle use  Cor: S1S2, RRR, No murmur or rub noted  ECG: SR with 1st degree AVB and LBBB ( yesterday 176 today)  Groin/Wound: Soft, mild tenderness. Minimal ecchymosis. No hematoma  Gastrointestinal: Soft, NT/ND, + Bowel Sounds  Neuro: = motor and sensory B/L, No focal deficits  Vascular: 1+ Pulses B/L, Trace edema  Extremities: No joint pain or swelling  Skin: Warm/Dry/Normal color, Normal turgor, no rashes                          8.6    6.84  )-----------( 129      ( 29 Jan 2021 04:38 )             27.6   01-29    134<L>  |  98  |  48<H>  ----------------------------<  128<H>  4.0   |  23  |  1.64<H>    Ca    9.0      29 Jan 2021 04:38    PT/INR - ( 27 Jan 2021 14:47 )   PT: 14.8 sec;   INR: 1.25 ratio         PTT - ( 27 Jan 2021 14:47 )  PTT:30.2 sec    Imaging Reviewed:    Post Op TTE:  < from: TTE with Doppler (w/Cont) (01.28.21 @ 08:10) >  EF (Visual Estimate): 75-80 %  Doppler Peak Velocity (m/sec): AoV=2.4  ------------------------------------------------------------------------  Observations:  Mitral Valve: Normal mitral valve. Mild mitral  regurgitation.  Aortic Valve/Aorta: Transcatheter aortic valve replacement.  Peak transaortic valve gradient equals 25 mm Hg, mean  transaortic valve gradient equals 14 mm Hg, which is  probably normal in the presence of a transcatheter aortic  valve replacement. NEELIMA 2.4sqcm Minimal  paravalvular aortic  regurgitation. Peak left ventricular outflow tract gradient  equals 13 mm Hg, mean gradient is equal to 7 mm Hg, LVOT  velocity time integral equals 35 cm.  LVOT diameter: 2 cm.  Left Atrium: Severely dilated left atrium.  LA volume index  = 55 cc/m2.  Left Ventricle: Hyperdynamic left ventricular systolic  function. Moderate concentric left ventricular hypertrophy.  Reversal of the E-A waves of the mitral inflow pattern  consistent with reduced compliance of the left ventricle.  Right Heart: Normal right atrium. Normal right ventricular  size and function. Normal tricuspid valve. Minimal  tricuspid regurgitation. Normal pulmonic valve.  Pericardium/Pleura: Thickened pericardium.  Hemodynamic: Estimated right atrial pressure is 8 mm Hg.  Estimated right ventricular systolic pressure equals 28 mm  Hg, assuming right atrial pressure equals 8 mm Hg,  consistent with normal pulmonary pressures.  ------------------------------------------------------------------------  Conclusions:  1. Transcatheter aortic valve replacement. Peak transaortic  valve gradient equals 25 mm Hg, mean transaortic valve  gradientequals 14 mm Hg, which is probably normal in the  presence of a transcatheter aortic valve replacement. NEELIMA  2.4sqcm Minimal  paravalvular aortic regurgitation.  2. Severely dilated left atrium.  LA volume index = 55  cc/m2.  3. Moderate concentric left ventricular hypertrophy.  4. Hyperdynamic left ventricular systolic function.  5. Normal right ventricular size and function.  6. Thickened pericardium.  *** No previous Echo exam.    < end of copied text >      Assesment/Plan:    89 Female S/P TAVR for Severe Aortic Stenosis, Chronic Diastolic HF, paroxysmal AFib  1.) S/P TAVR: ASA 81 mg po daily, Continue to Monitor Groins. Ambulate as tolerated. Post op TTE looked at, well seated. She has a new LBBB which has been trending towards her baseline, however her first degree is elongating.  2.) Patient will be discharged on an MCOT for a period of 30days to monitor for rhythm changes post TAVR.  3.) Paroxysmal Afib: rate controlled on Digoxin. 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   4.) Ambulate as tolerated  5.) Discharge Plan: Patient is to follow up with Dr. Sanders in 1 week post discharge. She should then follow up with the [facility]  in 30 days, with a repeat Transthoracic Echo to be done at that visit.    BAL Lester  [id number] *****Structural Heart Team*****    Subjective:  Patient resting comfortably in a chair, offering no complaints. There were no events overnight, no episodes of AFib.      PAST MEDICAL & SURGICAL HISTORY:  History of varicose veins of lower extremity  Glaucoma  HLD (hyperlipidemia)  Carotid stenosis  Hypertension  Mitral regurgitation  Aortic stenosis severe  History of appendectomy  H/O bilateral hip replacements 20 years ago      T(C): 36.8 (01-29-21 @ 03:12), Max: 36.8 (01-28-21 @ 19:56)  HR: 75 (01-29-21 @ 08:09) (75 - 85)  BP: 143/65 (01-29-21 @ 08:09) (131/62 - 175/68)  RR: 18 (01-29-21 @ 08:09) (18 - 18)  SpO2: 94% (01-29-21 @ 08:09) (94% - 97%)  Wt(kg): --  01-28 @ 07:01 - 01-29 @ 07:00  --------------------------------------------------------  IN: 920 mL / OUT: 850 mL / NET: 70 mL    01-29 @ 07:01 - 01-29 @ 10:59  --------------------------------------------------------  IN: 240 mL / OUT: 0 mL / NET: 240 mL      MEDICATIONS  (STANDING):  aspirin enteric coated 81 milliGRAM(s) Oral daily  atorvastatin 20 milliGRAM(s) Oral at bedtime  digoxin     Tablet 0.125 milliGRAM(s) Oral daily  furosemide    Tablet 20 milliGRAM(s) Oral daily  latanoprost 0.005% Ophthalmic Solution 1 Drop(s) Both EYES at bedtime  metoprolol succinate ER 50 milliGRAM(s) Oral daily  valsartan 80 milliGRAM(s) Oral daily    MEDICATIONS  (PRN):      Review of Symptoms:  Constitutional: Awake, Alert, Follows commands  Respiratory: Currently denies  Cardiac: Denies CP, Denies Palpitations  Gastrointestinal: Denies Pain, Denies N/V, tolerating po intake  Vascular: Negative  Extremities: + Edema, No joint pain or swelling  Neurological: Negative  Endocrine: No heat or cold intolerance, No excessive thirst  Heme/Onc: Negative    Exam:  General: A/Ox3, LUC, Follows commands  HEENT: Supple, No JVD, Trachea midline, no masses  Pulmonary: CTAB, = Chest Excursion, no accessory muscle use  Cor: S1S2, RRR, No murmur or rub noted  ECG: SR with 1st degree AVB and LBBB ( yesterday 176 today)  Groin/Wound: Soft, mild tenderness. Minimal ecchymosis. No hematoma  Gastrointestinal: Soft, NT/ND, + Bowel Sounds  Neuro: = motor and sensory B/L, No focal deficits  Vascular: 1+ Pulses B/L, Trace edema  Extremities: No joint pain or swelling  Skin: Warm/Dry/Normal color, Normal turgor, no rashes                          8.6    6.84  )-----------( 129      ( 29 Jan 2021 04:38 )             27.6   01-29    134<L>  |  98  |  48<H>  ----------------------------<  128<H>  4.0   |  23  |  1.64<H>    Ca    9.0      29 Jan 2021 04:38    PT/INR - ( 27 Jan 2021 14:47 )   PT: 14.8 sec;   INR: 1.25 ratio         PTT - ( 27 Jan 2021 14:47 )  PTT:30.2 sec    Imaging Reviewed:    Post Op TTE:  < from: TTE with Doppler (w/Cont) (01.28.21 @ 08:10) >  EF (Visual Estimate): 75-80 %  Doppler Peak Velocity (m/sec): AoV=2.4  ------------------------------------------------------------------------  Observations:  Mitral Valve: Normal mitral valve. Mild mitral  regurgitation.  Aortic Valve/Aorta: Transcatheter aortic valve replacement.  Peak transaortic valve gradient equals 25 mm Hg, mean  transaortic valve gradient equals 14 mm Hg, which is  probably normal in the presence of a transcatheter aortic  valve replacement. NEELIMA 2.4sqcm Minimal  paravalvular aortic  regurgitation. Peak left ventricular outflow tract gradient  equals 13 mm Hg, mean gradient is equal to 7 mm Hg, LVOT  velocity time integral equals 35 cm.  LVOT diameter: 2 cm.  Left Atrium: Severely dilated left atrium.  LA volume index  = 55 cc/m2.  Left Ventricle: Hyperdynamic left ventricular systolic  function. Moderate concentric left ventricular hypertrophy.  Reversal of the E-A waves of the mitral inflow pattern  consistent with reduced compliance of the left ventricle.  Right Heart: Normal right atrium. Normal right ventricular  size and function. Normal tricuspid valve. Minimal  tricuspid regurgitation. Normal pulmonic valve.  Pericardium/Pleura: Thickened pericardium.  Hemodynamic: Estimated right atrial pressure is 8 mm Hg.  Estimated right ventricular systolic pressure equals 28 mm  Hg, assuming right atrial pressure equals 8 mm Hg,  consistent with normal pulmonary pressures.  ------------------------------------------------------------------------  Conclusions:  1. Transcatheter aortic valve replacement. Peak transaortic  valve gradient equals 25 mm Hg, mean transaortic valve  gradientequals 14 mm Hg, which is probably normal in the  presence of a transcatheter aortic valve replacement. NEELIMA  2.4sqcm Minimal  paravalvular aortic regurgitation.  2. Severely dilated left atrium.  LA volume index = 55  cc/m2.  3. Moderate concentric left ventricular hypertrophy.  4. Hyperdynamic left ventricular systolic function.  5. Normal right ventricular size and function.  6. Thickened pericardium.  *** No previous Echo exam.    < end of copied text >      Assessment/Plan:    89 Female S/P TAVR for Severe Aortic Stenosis, Chronic Diastolic HF, paroxysmal AFib  1.) S/P TAVR: ASA 81 mg po daily, Continue to Monitor Groins. Ambulate as tolerated. Post op TTE looked at, well seated. She has a new LBBB which has been trending towards her baseline, however her first degree is elongating.  2.) Patient will be discharged on an MCOT for a period of 30days to monitor for rhythm changes post TAVR.  3.) Paroxysmal Afib: rate controlled on Digoxin. 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   4.) Ambulate as tolerated  5.) Discharge Plan: Patient is to follow up with Dr. Sanders in 1 week post discharge. She should then follow up with the [facility]  in 30 days, with a repeat Transthoracic Echo to be done at that visit.    BAL Lester  [id number]

## 2021-01-29 NOTE — DISCHARGE NOTE PROVIDER - NSDCFUSCHEDAPPT_GEN_ALL_CORE_FT
NAILA ALFREDO ; 02/02/2021 ; NPP CT Surg 300 Comm  NAILA ALFREDO ; 02/02/2021 ; P CT Surg 300 Comm NAILA Gaffney ; 02/02/2021 ; P CT Surg 300 Comm  NAILA ALFREDO ; 02/04/2021 ; NPP [facility]  NAILA ALFREDO ; 02/09/2021 ; NPP CT Surg 300 Comm NAILA Gaffney ; 02/11/2021 ; NPP Cardio Electro 300 Comm

## 2021-01-29 NOTE — DISCHARGE NOTE PROVIDER - NSDCFUADDAPPT_GEN_ALL_CORE_FT
YOU WILL NEED TO SEE DR GARCÍA IN 1 WEEK ON 2/9.  PLEASE CALL FOR YOUR APPOINTMENT  YOU WILL NEED TO SEE DR GARCÍA IN 1 WEEK ON 2/9 at 8am    YOU WILL RECEIVE A FOLLOW UP APPOINTMENT FOR AN ECHO AND FOLLOW UP WITH THE STRUCTURAL HEART TEAM AT YOUR APPOINTMENT WITH DR GARCÍA

## 2021-01-29 NOTE — PROGRESS NOTE ADULT - SUBJECTIVE AND OBJECTIVE BOX
im ok    VITAL SIGNS    Telemetry:  nsr 60-80    Vital Signs Last 24 Hrs  T(C): 36.8 (21 @ 03:12), Max: 36.8 (21 @ 19:56)  T(F): 98.3 (21 @ 03:12), Max: 98.3 (21 @ 19:56)  HR: 75 (21 @ 08:09) (75 - 85)  BP: 143/65 (21 @ 08:09) (131/62 - 175/68)  RR: 18 (21 @ 08:09) (18 - 18)  SpO2: 94% (21 @ 08:09) (94% - 97%)                    @ 07:01  -   @ 07:00  --------------------------------------------------------  IN: 920 mL / OUT: 850 mL / NET: 70 mL          Daily     Daily Weight in k (2021 08:09)            CAPILLARY BLOOD GLUCOSE                                              PHYSICAL EXAM        Neurology: alert and oriented x 3, nonfocal, no gross deficits  CV : s1 s2 RRR    Lungs: cta  Abdomen: soft, nontender, nondistended, positive bowel sounds, last bowel movement preop                      :    voiding        Extremities:      Trace edema   /  -   calve tenderness.  Bilat groins cdi          aspirin enteric coated 81 milliGRAM(s) Oral daily  atorvastatin 20 milliGRAM(s) Oral at bedtime  digoxin     Tablet 0.125 milliGRAM(s) Oral daily  furosemide    Tablet 20 milliGRAM(s) Oral daily  latanoprost 0.005% Ophthalmic Solution 1 Drop(s) Both EYES at bedtime  metoprolol succinate ER 50 milliGRAM(s) Oral daily  valsartan 80 milliGRAM(s) Oral daily                    Physical Therapy Rec:   Home  [  ]   Home w/ PT  [  ]  Rehab  [  ]  Discussed with Cardiothoracic Team at AM rounds.

## 2021-01-29 NOTE — DISCHARGE NOTE PROVIDER - NSDCFUADDINST_GEN_ALL_CORE_FT
Daily Shower Weight yourself daily and notify any weight gain greater than 2-3 pounds in 24 hours.   Regular diet - low fat, low cholesterol, no added salt.  Follow Cardiac Surgery Do's and Don'ts discharge instructions.    No driving until cleared by MD.   No heavy lifting nothing greater than 5 pounds until cleared by MD.   8. Call / Notify MD any fever greater than 101.0  9. Increase Activity as tolerated.

## 2021-01-29 NOTE — PROGRESS NOTE ADULT - PROBLEM SELECTOR PLAN 1
Continue asa 81 daily  Check daily EKG right femoral TVP in place     plan to remove today  dig  125 mcg   and toprol 25  ,  home dose 50 mg  Disposition: Plan for home Fri with LISA

## 2021-01-29 NOTE — DISCHARGE NOTE PROVIDER - PROVIDER TOKENS
PROVIDER:[[id number]],SCHEDULEDAPPT:[02/02/2021],SCHEDULEDAPPTTIME:[01:15 PM]],PROVIDER:[TOKEN:[[id number]]] PROVIDER:[TOKEN:[[id number]]],PROVIDER:[TOKEN:[[id number]]] PROVIDER:[TOKEN:[[id number]]],PROVIDER:[[id number]],SCHEDULEDAPPT:[02/09/2021],SCHEDULEDAPPTTIME:[08:00 AM]]

## 2021-01-29 NOTE — PROGRESS NOTE ADULT - ASSESSMENT
89 y/o female with h/o anemia, arrythmia, HTN, HLD, ASHD, carotid stenosis, MR and severe aortic stenosis with c/o progressively worsening dyspnea on exertion is scheduled for TAVR on 1/27/2021.       1/27 right transfemoral TAVR 29 mm core valve  NL EF, LBBB at baseline, ECG post op: patient doc into 30-40's, Rate increased to 60 and 20 output at that time now currently SR 50 - 60s, pacer settings changed to rate of 40 & output of 20. Pt now transferred to SDU, TVP to remain in place.   1/28   TVP,  started home dig and toprol 25   qd   now in afib  80   TTE done  1/29 Toproil increased to 50, and diovan started by Dr Sanders  TTE: < from: TTE with Doppler (w/Cont) (01.28.21 @ 08:10) >  Conclusions:  1. Transcatheter aortic valve replacement. Peak transaortic  valve gradient equals 25 mm Hg, mean transaortic valve  gradientequals 14 mm Hg, which is probably normal in the  presence of a transcatheter aortic valve replacement. NEELIMA  2.4sqcm Minimal  paravalvular aortic regurgitation.  2. Severely dilated left atrium.  LA volume index = 55  cc/m2.  3. Moderate concentric left ventricular hypertrophy.  4. Hyperdynamic left ventricular systolic function.  5. Normal right ventricular size and function.  6. Thickened pericardium.  *** No previous Echo exam.    d/c planning today with [facility] cardiology eval    < end of copied text >

## 2021-01-29 NOTE — CONSULT NOTE ADULT - ATTENDING COMMENTS
89 year old lady with pre-existing LBBB and first degree AV block who underwent TAVR. Immediately post procedure, the WI interval and QRS duration prolonged. The WI interval was returned to baseline (200 - 220 msec; note P wave amplitude is low and difficult to accurately measure) but the QRS duration has remained long (144 at baseline to 180 now). Over the past 48 hours, QRS has not narrowed down.    Given her age, she is at risk for falls from intermittent heart block. Widening QRS is a risk factor for late heart block after TAVR. Hence, I think she should proceed to cardiac pacing prior to discharge. We will schedule for tomorrow am.

## 2021-01-29 NOTE — DISCHARGE NOTE PROVIDER - NSDCCPCAREPLAN_GEN_ALL_CORE_FT
PRINCIPAL DISCHARGE DIAGNOSIS  Diagnosis: S/P TAVR (transcatheter aortic valve replacement)  Assessment and Plan of Treatment: S/P TAVR (transcatheter aortic valve replacement)

## 2021-01-29 NOTE — DISCHARGE NOTE PROVIDER - HOSPITAL COURSE
89 y/o female with h/o anemia, arrythmia, HTN, HLD, ASHD, carotid stenosis, MR and severe aortic stenosis with c/o progressively worsening dyspnea on exertion is scheduled for TAVR on 1/27/2021.       1/27 right transfemoral TAVR 29 mm core valve  NL EF, LBBB at baseline, ECG post op: patient doc into 30-40's, Rate increased to 60 and 20 output at that time now currently SR 50 - 60s, pacer settings changed to rate of 40 & output of 20. Pt now transferred to SDU, TVP to remain in place.   1/28   TVP,  started home dig and toprol 25   qd   now in afib  80   TTE done  1/29 Toproil increased to 50, and diovan started by Dr Sanders  TTE: < from: TTE with Doppler (w/Cont) (01.28.21 @ 08:10) >  Conclusions:  1. Transcatheter aortic valve replacement. Peak transaortic  valve gradient equals 25 mm Hg, mean transaortic valve  gradientequals 14 mm Hg, which is probably normal in the  presence of a transcatheter aortic valve replacement. NEELIMA  2.4sqcm Minimal  paravalvular aortic regurgitation.  2. Severely dilated left atrium.  LA volume index = 55  cc/m2.  3. Moderate concentric left ventricular hypertrophy.  4. Hyperdynamic left ventricular systolic function.  5. Normal right ventricular size and function.  6. Thickened pericardium.  *** No previous Echo exam.    d/c planning today with [facility] after cardiology eval-  QRS and ND prolongation, EPS consulted 89 y/o female with h/o anemia, arrythmia, HTN, HLD, ASHD, carotid stenosis, MR and severe aortic stenosis with c/o progressively worsening dyspnea on exertion is scheduled for TAVR on 1/27/2021.       1/27 right transfemoral TAVR 29 mm core valve  NL EF, LBBB at baseline, ECG post op: patient doc into 30-40's, Rate increased to 60 and 20 output at that time now currently SR 50 - 60s, pacer settings changed to rate of 40 & output of 20. Pt now transferred to SDU, TVP to remain in place.   1/28   TVP,  started home dig and toprol 25   qd   now in afib  80   TTE done  1/29 Toproil increased to 50, and diovan started by Dr Sanders  TTE: < from: TTE with Doppler (w/Cont) (01.28.21 @ 08:10) >  Conclusions:  1. Transcatheter aortic valve replacement. Peak transaortic  valve gradient equals 25 mm Hg, mean transaortic valve  gradientequals 14 mm Hg, which is probably normal in the  presence of a transcatheter aortic valve replacement. NEELIMA  2.4sqcm Minimal  paravalvular aortic regurgitation.  2. Severely dilated left atrium.  LA volume index = 55  cc/m2.  3. Moderate concentric left ventricular hypertrophy.  4. Hyperdynamic left ventricular systolic function.  5. Normal right ventricular size and function.  6. Thickened pericardium.  *** No previous Echo exam.    d/c planning  with [facility] after cardiology eval-  QRS and AL prolongation, EPS consulted  1/30 VSS, QRS remains prolonged compared to baseline, recommended for PPM Sunday per EP. BUN/Cr 56/1.81, Renal consulted - diovan & lasix dc'd, started on norvasc 2.5 qd, check Renal US, CXR, UA, and urine lytes.   1/31 VSS, s/p [employer] PPM (DDD 60 - 130). Post implant CXR no PTX. Transfuse 1U PRBC post PPM today per Dr. Sanders. BUN/Cr 61/1.8 unchanged.   2/1  Pts creat trending down.  CXR today.  D/c planning delayed d/t weather concerns

## 2021-01-30 DIAGNOSIS — I44.7 LEFT BUNDLE-BRANCH BLOCK, UNSPECIFIED: ICD-10-CM

## 2021-01-30 DIAGNOSIS — N17.9 ACUTE KIDNEY FAILURE, UNSPECIFIED: ICD-10-CM

## 2021-01-30 LAB
ANION GAP SERPL CALC-SCNC: 10 MMOL/L — SIGNIFICANT CHANGE UP (ref 5–17)
APPEARANCE UR: ABNORMAL
BILIRUB UR-MCNC: NEGATIVE — SIGNIFICANT CHANGE UP
BUN SERPL-MCNC: 56 MG/DL — HIGH (ref 7–23)
CALCIUM SERPL-MCNC: 8.7 MG/DL — SIGNIFICANT CHANGE UP (ref 8.4–10.5)
CHLORIDE SERPL-SCNC: 94 MMOL/L — LOW (ref 96–108)
CO2 SERPL-SCNC: 24 MMOL/L — SIGNIFICANT CHANGE UP (ref 22–31)
COLOR SPEC: SIGNIFICANT CHANGE UP
CREAT ?TM UR-MCNC: 54 MG/DL — SIGNIFICANT CHANGE UP
CREAT SERPL-MCNC: 1.81 MG/DL — HIGH (ref 0.5–1.3)
DIFF PNL FLD: NEGATIVE — SIGNIFICANT CHANGE UP
GLUCOSE SERPL-MCNC: 115 MG/DL — HIGH (ref 70–99)
GLUCOSE UR QL: NEGATIVE — SIGNIFICANT CHANGE UP
HCT VFR BLD CALC: 27.3 % — LOW (ref 34.5–45)
HGB BLD-MCNC: 8.7 G/DL — LOW (ref 11.5–15.5)
KETONES UR-MCNC: NEGATIVE — SIGNIFICANT CHANGE UP
LEUKOCYTE ESTERASE UR-ACNC: NEGATIVE — SIGNIFICANT CHANGE UP
MCHC RBC-ENTMCNC: 28.6 PG — SIGNIFICANT CHANGE UP (ref 27–34)
MCHC RBC-ENTMCNC: 31.9 GM/DL — LOW (ref 32–36)
MCV RBC AUTO: 89.8 FL — SIGNIFICANT CHANGE UP (ref 80–100)
NITRITE UR-MCNC: NEGATIVE — SIGNIFICANT CHANGE UP
NRBC # BLD: 0 /100 WBCS — SIGNIFICANT CHANGE UP (ref 0–0)
OSMOLALITY UR: 221 MOSM/KG — SIGNIFICANT CHANGE UP (ref 50–1200)
PH UR: 6 — SIGNIFICANT CHANGE UP (ref 5–8)
PLATELET # BLD AUTO: 124 K/UL — LOW (ref 150–400)
POTASSIUM SERPL-MCNC: 3.8 MMOL/L — SIGNIFICANT CHANGE UP (ref 3.5–5.3)
POTASSIUM SERPL-SCNC: 3.8 MMOL/L — SIGNIFICANT CHANGE UP (ref 3.5–5.3)
PROT ?TM UR-MCNC: 16 MG/DL — HIGH (ref 0–12)
PROT UR-MCNC: SIGNIFICANT CHANGE UP
PROT/CREAT UR-RTO: 0.3 RATIO — HIGH (ref 0–0.2)
RBC # BLD: 3.04 M/UL — LOW (ref 3.8–5.2)
RBC # FLD: 12.3 % — SIGNIFICANT CHANGE UP (ref 10.3–14.5)
SODIUM SERPL-SCNC: 128 MMOL/L — LOW (ref 135–145)
SODIUM UR-SCNC: <35 MMOL/L — SIGNIFICANT CHANGE UP
SP GR SPEC: 1.01 — SIGNIFICANT CHANGE UP (ref 1.01–1.02)
UROBILINOGEN FLD QL: NEGATIVE — SIGNIFICANT CHANGE UP
WBC # BLD: 6.82 K/UL — SIGNIFICANT CHANGE UP (ref 3.8–10.5)
WBC # FLD AUTO: 6.82 K/UL — SIGNIFICANT CHANGE UP (ref 3.8–10.5)

## 2021-01-30 PROCEDURE — 99232 SBSQ HOSP IP/OBS MODERATE 35: CPT

## 2021-01-30 PROCEDURE — 93010 ELECTROCARDIOGRAM REPORT: CPT

## 2021-01-30 PROCEDURE — 71045 X-RAY EXAM CHEST 1 VIEW: CPT | Mod: 26

## 2021-01-30 PROCEDURE — 93010 ELECTROCARDIOGRAM REPORT: CPT | Mod: 77

## 2021-01-30 RX ORDER — CHLORHEXIDINE GLUCONATE 213 G/1000ML
1 SOLUTION TOPICAL ONCE
Refills: 0 | Status: COMPLETED | OUTPATIENT
Start: 2021-01-31 | End: 2021-01-31

## 2021-01-30 RX ORDER — AMLODIPINE BESYLATE 2.5 MG/1
2.5 TABLET ORAL DAILY
Refills: 0 | Status: DISCONTINUED | OUTPATIENT
Start: 2021-01-30 | End: 2021-02-02

## 2021-01-30 RX ORDER — CEFAZOLIN SODIUM 1 G
2000 VIAL (EA) INJECTION ONCE
Refills: 0 | Status: DISCONTINUED | OUTPATIENT
Start: 2021-01-31 | End: 2021-02-02

## 2021-01-30 RX ORDER — ENOXAPARIN SODIUM 100 MG/ML
30 INJECTION SUBCUTANEOUS DAILY
Refills: 0 | Status: DISCONTINUED | OUTPATIENT
Start: 2021-01-30 | End: 2021-01-30

## 2021-01-30 RX ADMIN — AMLODIPINE BESYLATE 2.5 MILLIGRAM(S): 2.5 TABLET ORAL at 12:20

## 2021-01-30 RX ADMIN — Medication 81 MILLIGRAM(S): at 10:38

## 2021-01-30 RX ADMIN — VALSARTAN 80 MILLIGRAM(S): 80 TABLET ORAL at 03:11

## 2021-01-30 RX ADMIN — ENOXAPARIN SODIUM 30 MILLIGRAM(S): 100 INJECTION SUBCUTANEOUS at 10:38

## 2021-01-30 RX ADMIN — ATORVASTATIN CALCIUM 20 MILLIGRAM(S): 80 TABLET, FILM COATED ORAL at 20:58

## 2021-01-30 RX ADMIN — LATANOPROST 1 DROP(S): 0.05 SOLUTION/ DROPS OPHTHALMIC; TOPICAL at 20:57

## 2021-01-30 RX ADMIN — Medication 20 MILLIGRAM(S): at 06:25

## 2021-01-30 RX ADMIN — Medication 50 MILLIGRAM(S): at 03:11

## 2021-01-30 NOTE — PROGRESS NOTE ADULT - SUBJECTIVE AND OBJECTIVE BOX
24H hour events: Patient without complaints    MEDICATIONS:  aspirin enteric coated 81 milliGRAM(s) Oral daily  enoxaparin Injectable 30 milliGRAM(s) SubCutaneous daily  furosemide    Tablet 20 milliGRAM(s) Oral daily  metoprolol succinate ER 50 milliGRAM(s) Oral daily  atorvastatin 20 milliGRAM(s) Oral at bedtime  latanoprost 0.005% Ophthalmic Solution 1 Drop(s) Both EYES at bedtime      REVIEW OF SYSTEMS:  See HPI, otherwise ROS negative.    PHYSICAL EXAM:  T(C): 37.2 (01-30-21 @ 07:17), Max: 37.4 (01-30-21 @ 02:59)  HR: 68 (01-30-21 @ 07:17) (68 - 75)  BP: 157/67 (01-30-21 @ 07:17) (141/66 - 178/73)  RR: 18 (01-30-21 @ 07:17) (18 - 18)  SpO2: 97% (01-30-21 @ 07:17) (96% - 99%)  Wt(kg): --  I&O's Summary    29 Jan 2021 07:01  -  30 Jan 2021 07:00  --------------------------------------------------------  IN: 360 mL / OUT: 1250 mL / NET: -890 mL    30 Jan 2021 07:01  -  30 Jan 2021 10:09  --------------------------------------------------------  IN: 360 mL / OUT: 200 mL / NET: 160 mL        Appearance: Alert. NAD	  Cardiovascular: +S1S2 RRR   Respiratory: CTA B/L	  Psychiatry: A & O x 3, Mood & affect appropriate  Neurologic: Non-focal  Extremities: +pitting edema BLE  Vascular: Peripheral pulses palpable 2+ bilaterally      LABS:	 	    CBC Full  -  ( 30 Jan 2021 05:56 )  WBC Count : 6.82 K/uL  Hemoglobin : 8.7 g/dL  Hematocrit : 27.3 %  Platelet Count - Automated : 124 K/uL  Mean Cell Volume : 89.8 fl  Mean Cell Hemoglobin : 28.6 pg  Mean Cell Hemoglobin Concentration : 31.9 gm/dL    01-30    128<L>  |  94<L>  |  56<H>  ----------------------------<  115<H>  3.8   |  24  |  1.81<H>  01-29    134<L>  |  98  |  48<H>  ----------------------------<  128<H>  4.0   |  23  |  1.64<H>    Ca    8.7      30 Jan 2021 05:55  Ca    9.0      29 Jan 2021 04:38    TELEMETRY: SR w/ LBBB; 60's bpm; no significant doc or AVB seen  	    ECG:  SR w/ LBBBB; QRSd: 176ms    	  ASSESSMENT/PLAN: 	  89 year old female h/o anemia, pre-existing LBBB (QRSd 148ms), 1st degree AVB, HTN, HLD, ASHD, carotid stenosis, mild-mod MR and severe AS s/p TAVR on 1/27/2021 c/b progressive widening of LBBB.   1) Widening of pre-existing LBBB (QRSd 148ms-->182ms)  2) Severe AS s/p TAVR     24H hour events: Patient without complaints    MEDICATIONS:  aspirin enteric coated 81 milliGRAM(s) Oral daily  enoxaparin Injectable 30 milliGRAM(s) SubCutaneous daily  furosemide    Tablet 20 milliGRAM(s) Oral daily  metoprolol succinate ER 50 milliGRAM(s) Oral daily  atorvastatin 20 milliGRAM(s) Oral at bedtime  latanoprost 0.005% Ophthalmic Solution 1 Drop(s) Both EYES at bedtime      REVIEW OF SYSTEMS:  See HPI, otherwise ROS negative.    PHYSICAL EXAM:  T(C): 37.2 (01-30-21 @ 07:17), Max: 37.4 (01-30-21 @ 02:59)  HR: 68 (01-30-21 @ 07:17) (68 - 75)  BP: 157/67 (01-30-21 @ 07:17) (141/66 - 178/73)  RR: 18 (01-30-21 @ 07:17) (18 - 18)  SpO2: 97% (01-30-21 @ 07:17) (96% - 99%)  Wt(kg): --  I&O's Summary    29 Jan 2021 07:01  -  30 Jan 2021 07:00  --------------------------------------------------------  IN: 360 mL / OUT: 1250 mL / NET: -890 mL    30 Jan 2021 07:01  -  30 Jan 2021 10:09  --------------------------------------------------------  IN: 360 mL / OUT: 200 mL / NET: 160 mL        Appearance: Alert. NAD	  Cardiovascular: +S1S2 RRR   Respiratory: CTA B/L	  Psychiatry: A & O x 3, Mood & affect appropriate  Neurologic: Non-focal  Extremities: +pitting edema BLE  Vascular: Peripheral pulses palpable 2+ bilaterally      LABS:	 	    CBC Full  -  ( 30 Jan 2021 05:56 )  WBC Count : 6.82 K/uL  Hemoglobin : 8.7 g/dL  Hematocrit : 27.3 %  Platelet Count - Automated : 124 K/uL  Mean Cell Volume : 89.8 fl  Mean Cell Hemoglobin : 28.6 pg  Mean Cell Hemoglobin Concentration : 31.9 gm/dL    01-30    128<L>  |  94<L>  |  56<H>  ----------------------------<  115<H>  3.8   |  24  |  1.81<H>  01-29    134<L>  |  98  |  48<H>  ----------------------------<  128<H>  4.0   |  23  |  1.64<H>    Ca    8.7      30 Jan 2021 05:55  Ca    9.0      29 Jan 2021 04:38    TELEMETRY: SR w/ LBBB; 60's bpm; no significant doc or AVB seen  	    ECG:  SR w/ LBBBB; QRSd: 176ms    	  ASSESSMENT/PLAN: 	  89 year old female h/o anemia, pre-existing LBBB (QRSd 148ms), 1st degree AVB, HTN, HLD, ASHD, carotid stenosis, mild-mod MR and severe AS s/p TAVR on 1/27/2021 c/b progressive widening of LBBB.   1) Baseline LBBB--progressive widening of QRS  2) Severe AS s/p TAVR    --ECG today shows that patient's LBBB still quite wide at ~176ms which is much wider that her baseline QRS duration prior to TAVR. Given her age and LBBB w/ significant progressive widening of QRS with concerns post TAVR of development of heart block, recommend a dual chamber pacemaker. Discussed with patient and daughter, Shani, via phone in regards to above and recommendation fo PPM. After all questions answered, patient' daughter are agreeable to proceed with pacemaker  --For PPM implant tomorrow. Keep NPO after MN  --Type & Screen  --D/C SC lovenox after this evening

## 2021-01-30 NOTE — PROGRESS NOTE ADULT - ATTENDING COMMENTS
EKG, laboratory studies and imaging studies were personally reviewed.    All questions and concerns were addressed.
She has persistence of wide LBBB. QRS duration went from 140 pre TAVR to 180ms post procedure and remains so. CA interval is 220 msec. These are risk factors for late AV block post TAVR. Given her age, she would benefit from cardiac pacing. I have discussed the procedure, risks, benefits and alternatives with patient and with daughter, Shani who understand and wish to proceed.
The patients EKGs were reviewed with slight widening of TX/QRS.  She has been started on a beta blocker with no significant change in the patients EKG.  Will ask EP to assess the patient.  If she continues to clinically do well may be discharged later today.    All questions and concerns of the patient were addressed.    EKG, laboratory studies and imaging studies were personally reviewed.      Plan discussed with cardiac surgery and structural heart team.

## 2021-01-30 NOTE — CONSULT NOTE ADULT - SUBJECTIVE AND OBJECTIVE BOX
HPI:  89 yo female with CAD, HTN, PVD, MR and severe aortic stenosis p/w worsening dyspnea on exertion. She was admitted and underwent TAVR on 1/27/2021. Her postoperative course has been notable for Afib and now an elevated creatinine level. Accordingly, a renal evaluation was requested. She currently offers no complaints and reports normal voids.    PAST MEDICAL & SURGICAL HISTORY:  History of varicose veins of lower extremity  Glaucoma  HLD (hyperlipidemia)  Carotid stenosis  Hypertension  Mitral regurgitation  Aortic stenosis  History of appendectomy  H/O bilateral hip replacements  20 years ago    MEDICATIONS  (STANDING):  aspirin enteric coated 81 milliGRAM(s) Oral daily  atorvastatin 20 milliGRAM(s) Oral at bedtime  furosemide    Tablet 20 milliGRAM(s) Oral daily  latanoprost 0.005% Ophthalmic Solution 1 Drop(s) Both EYES at bedtime  metoprolol succinate ER 50 milliGRAM(s) Oral daily    Allergies  No Known Allergies    SOCIAL HISTORY:  Denies EtOH, Smoking.    REVIEW OF SYSTEMS:  Denies any nausea, vomiting, diarrhea, fevers or chills. Denies chest pain, SOB, focal weakness, hematuria or dysuria.  Good oral intake and denies fatigue or weakness.  All other pertinent systems are reviewed and are negative.    VITALS:  T(F): 99 (01-30-21 @ 07:17), Max: 99.3 (01-30-21 @ 02:59)  HR: 68 (01-30-21 @ 07:17) (68 - 75)  BP: 157/67 (01-30-21 @ 07:17) (141/66 - 178/73)  SpO2: 97% (01-30-21 @ 07:17) (96% - 99%)  Wt(kg): --  Weight (kg): 89.3 (01-27 @ 12:00)    01-29 @ 07:01  -  01-30 @ 07:00  --------------------------------------------------------  IN: 360 mL / OUT: 1250 mL / NET: -890 mL    01-30 @ 07:01  -  01-30 @ 10:57  --------------------------------------------------------  IN: 360 mL / OUT: 200 mL / NET: 160 mL    PHYSICAL EXAM:  General:  alert, cooperative and no distress  HEENT: no trauma  Lungs: diminished BS at bases  Heart: normal S1/S2  Abdomen: soft, non-tender not distended, + BS  Extremities: tr edema  Urologic: no puentes  Neurologic: Grossly normal  Skin: no rashes    LABS:                        8.7    6.82  )-----------( 124      ( 30 Jan 2021 05:56 )             27.3     01-30    128<L>  |  94<L>  |  56<H>  ----------------------------<  115<H>  3.8   |  24  |  1.81<H>    Ca    8.7      30 Jan 2021 05:55      BASELINE LABS  Creatinine, Serum: 1.40 mg/dL (01.25.21 @ 12:26)   Creatinine, Serum: 1.46 mg/dL (12.30.20 @ 08:45)     ASSESSMENT:  Patient is a 89y Female with CAD, HTN, PVD, MR and severe aortic stenosis p/w worsening RIVAS. She is now s/p TAVR followed by acute on chronic kidney injury.  - CKD: likely stage 3 with baseline creatinine ~1.4. Suspect some HTN nephrosclerosis.  - FELICITAS: nonoliguric. Likely prerenal due to diuresis, relative hypotension (in setting of ARB) +/-  AFib. THEA from contrast less likely.  R/o retention. Azotemia rising  - hyponatremia: ?hypovolemic, Na+ declining  - HTN: BP elevated.    RECOMMENDATIONS:  - check UA and urine Na/creat/protein/osm  - check CXR  - check renal and bladder US  - hold diuretics; DC lasix pending CXR  - start amlodipine 2.5 mg daily  - continue to hold ARB  - please dose any new medications for a CrCl of ~ 20 cc/min  - avoid NSAIDs/nephrotoxins as able    Thank you for the courtesy of this consultation.    Gregory Griggs M.D.  [facility], [facility]  [id number]  [place] [address]  [phone]      
CHIEF COMPLAINT: "I am having a TAVR"    HISTORY OF PRESENT ILLNESS:  89 year old female with h/o anemia, pre-existing LBBB (QRSd 148ms) and 1st degree AV delay, HTN, HLD, ASHD, carotid stenosis, mild-mod mitral regurgitation and severe aortic stenosis with c/o progressively worsening dyspnea on exertion who presented for scheduled for TAVR on 1/27/2021. She denies history of chest pain, palpitations, lightheadedness or syncope.    Of Note, chart states she has a history of arrhythmia, I have reached out to her cardiologist's office (Dr. Ocampo) who stated that she has been on digoxin of unknown reason for years since she was in [facility]. She was placed on toprol for sinus tachycardia.        Allergies    No Known Allergies    Intolerances    	  MEDICATIONS:  aspirin enteric coated 81 milliGRAM(s) Oral daily  furosemide    Tablet 20 milliGRAM(s) Oral daily  metoprolol succinate ER 50 milliGRAM(s) Oral daily  valsartan 80 milliGRAM(s) Oral daily  atorvastatin 20 milliGRAM(s) Oral at bedtime  latanoprost 0.005% Ophthalmic Solution 1 Drop(s) Both EYES at bedtime      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      SOCIAL HISTORY:  , lives with daughter (Shani Varghese), drinks teena/tea 3-4 cups per day, denies smoking, drinks 1-2 glass of wine or beer with dinner daily      REVIEW OF SYSTEMS:  See HPI. Otherwise, 10 point ROS done and otherwise negative.    PHYSICAL EXAM:  T(C): 36.7 (01-29-21 @ 15:12), Max: 36.8 (01-28-21 @ 19:56)  HR: 75 (01-29-21 @ 15:12) (73 - 85)  BP: 178/73 (01-29-21 @ 15:12) (131/62 - 178/73)  RR: 18 (01-29-21 @ 15:12) (18 - 18)  SpO2: 99% (01-29-21 @ 15:12) (94% - 99%)  Wt(kg): --  I&O's Summary    28 Jan 2021 07:01 - 29 Jan 2021 07:00  --------------------------------------------------------  IN: 920 mL / OUT: 850 mL / NET: 70 mL    29 Jan 2021 07:01  -  29 Jan 2021 15:46  --------------------------------------------------------  IN: 360 mL / OUT: 450 mL / NET: -90 mL      Appearance: NAD  HEENT:   Normal oral mucosa, PERRL, EOMI	  Lymphatic: No lymphadenopathy  Cardiovascular: Normal S1 S2, RRR, No JVD, No murmurs  Respiratory: Lungs clear to auscultation	  Psychiatry: A & O x 3, Mood & affect appropriate  Gastrointestinal:  Soft, Non-tender, + BS	  Skin: No rashes, No ecchymoses, No cyanosis	  Neurologic: Non-focal  Extremities: Normal range of motion, No clubbing or cyanosis. B/L LE +3 edema. Right groin soft and nontender  Vascular: Peripheral pulses palpable 2+ bilaterally      LABS:	 	    CBC Full  -  ( 29 Jan 2021 04:38 )  WBC Count : 6.84 K/uL  Hemoglobin : 8.6 g/dL  Hematocrit : 27.6 %  Platelet Count - Automated : 129 K/uL  Mean Cell Volume : 89.6 fl  Mean Cell Hemoglobin : 27.9 pg  Mean Cell Hemoglobin Concentration : 31.2 gm/dL  Auto Neutrophil # : x  Auto Lymphocyte # : x  Auto Monocyte # : x  Auto Eosinophil # : x  Auto Basophil # : x  Auto Neutrophil % : x  Auto Lymphocyte % : x  Auto Monocyte % : x  Auto Eosinophil % : x  Auto Basophil % : x    01-29    134<L>  |  98  |  48<H>  ----------------------------<  128<H>  4.0   |  23  |  1.64<H>  01-28    136  |  100  |  45<H>  ----------------------------<  140<H>  4.3   |  25  |  1.27    Ca    9.0      29 Jan 2021 04:38  Ca    8.9      28 Jan 2021 05:29        TELEMETRY: SR w/ 1st degree AVB at 60-70's  	    ECG: personally reviewed  	    < from: TTE with Doppler (w/Cont) (01.28.21 @ 08:10) >  Dimensions:    Normal Values:  LA:     3.8    2.0 - 4.0 cm  Ao:            2.0 - 3.8 cm  SEPTUM: 1.5    0.6 - 1.2 cm  PWT:    1.5    0.6 - 1.1 cm  LVIDd:  5.0    3.0 - 5.6 cm  LVIDs:  2.4    1.8 - 4.0 cm  Derived variables:  LVMI: 166 g/m2  RWT: 0.60  Fractional short: 52 %  EF (Visual Estimate): 75-80 %  Doppler Peak Velocity (m/sec): AoV=2.4  ------------------------------------------------------------------------  Observations:  Mitral Valve: Normal mitral valve. Mild mitral  regurgitation.  Aortic Valve/Aorta: Transcatheter aortic valve replacement.  Peak transaortic valve gradient equals 25 mm Hg, mean  transaortic valve gradient equals 14 mm Hg, which is  probably normal in the presence of a transcatheter aortic  valve replacement. NEELIMA 2.4sqcm Minimal  paravalvular aortic  regurgitation. Peak left ventricular outflow tract gradient  equals 13 mm Hg, mean gradient is equal to 7 mm Hg, LVOT  velocity time integral equals 35 cm.  LVOT diameter: 2 cm.  Left Atrium: Severely dilated left atrium.  LA volume index  = 55 cc/m2.  Left Ventricle: Hyperdynamic left ventricular systolic  function. Moderate concentric left ventricular hypertrophy.  Reversal of the E-A waves of the mitral inflow pattern  consistent with reduced compliance of the left ventricle.  Right Heart: Normal right atrium. Normal right ventricular  size and function. Normal tricuspid valve. Minimal  tricuspid regurgitation. Normal pulmonic valve.  Pericardium/Pleura: Thickened pericardium.Hemodynamic: Estimated right atrial pressure is 8 mm Hg.  Estimated right ventricular systolic pressure equals 28 mm  Hg, assuming right atrial pressure equals 8 mm Hg,  consistent with normal pulmonary pressures.  ------------------------------------------------------------------------  Conclusions:  1. Transcatheter aortic valve replacement. Peak transaortic  valve gradient equals 25 mm Hg, mean transaortic valve  gradientequals 14 mm Hg, which is probably normal in the  presence of a transcatheter aortic valve replacement. NEELIMA  2.4sqcm Minimal  paravalvular aortic regurgitation.  2. Severely dilated left atrium.  LA volume index = 55  cc/m2.  3. Moderate concentric left ventricular hypertrophy.  4. Hyperdynamic left ventricular systolic function.  5. Normal right ventricular size and function.  6. Thickened pericardium.  *** No previous Echo exam.    < end of copied text >

## 2021-01-30 NOTE — PROGRESS NOTE ADULT - PROBLEM SELECTOR PLAN 3
EP following, Dr. Dupree  Check daily EKG  NPO p MN for PPM implant Sun 1/31  Check type & screen  Hold SQ LVX

## 2021-01-30 NOTE — PROGRESS NOTE ADULT - PROBLEM SELECTOR PLAN 1
Continue asa 81 daily  Check daily EKG right femoral TVP in place     plan to remove today  dig  125 mcg   and toprol 25  ,  home dose 50 mg  Disposition: Plan for home Fri with [facility] Continue asa 81 daily and atorvastatin 20 HS  Check daily EKG   Disposition: Plan for home Mon if medically cleared

## 2021-01-30 NOTE — PROGRESS NOTE ADULT - PROBLEM SELECTOR PLAN 2
dig,   toprol  ? need for ac- now nsr Continue Toprol XL 50 daily  Check daily EKG  Currently SR 60s

## 2021-01-30 NOTE — PROGRESS NOTE ADULT - ASSESSMENT
89 y/o female with h/o anemia, arrythmia, HTN, HLD, ASHD, carotid stenosis, MR and severe aortic stenosis with c/o progressively worsening dyspnea on exertion is scheduled for TAVR on 1/27/2021.       1/27 right transfemoral TAVR 29 mm core valve  NL EF, LBBB at baseline, ECG post op: patient doc into 30-40's, Rate increased to 60 and 20 output at that time now currently SR 50 - 60s, pacer settings changed to rate of 40 & output of 20. Pt now transferred to SDU, TVP to remain in place.   1/28   TVP,  started home dig and toprol 25   qd   now in afib  80   TTE done  1/29 Toproil increased to 50, and diovan started by Dr Sanders  TTE: < from: TTE with Doppler (w/Cont) (01.28.21 @ 08:10) >  Conclusions:  1. Transcatheter aortic valve replacement. Peak transaortic  valve gradient equals 25 mm Hg, mean transaortic valve  gradientequals 14 mm Hg, which is probably normal in the  presence of a transcatheter aortic valve replacement. NEELIMA  2.4sqcm Minimal  paravalvular aortic regurgitation.  2. Severely dilated left atrium.  LA volume index = 55  cc/m2.  3. Moderate concentric left ventricular hypertrophy.  4. Hyperdynamic left ventricular systolic function.  5. Normal right ventricular size and function.  6. Thickened pericardium.  *** No previous Echo exam.    d/c planning today with [facility] cardiology eval    < end of copied text >   This is a 89 y/o female with h/o anemia, arrythmia, HTN, HLD, ASHD, carotid stenosis, MR and severe aortic stenosis with c/o progressively worsening dyspnea on exertion is scheduled for TAVR on 1/27/2021.       1/27 right transfemoral TAVR 29 mm core valve  NL EF, LBBB at baseline, ECG post op: patient doc into 30-40's, Rate increased to 60 and 20 output at that time now currently SR 50 - 60s, pacer settings changed to rate of 40 & output of 20. Pt now transferred to SDU, TVP to remain in place.   1/28   TVP,  started home dig and toprol 25   qd   now in afib  80   TTE done  1/29 Toproil increased to 50, and diovan started by Dr Sanders. TTE: minimal paravalvular AR, hyperdynamic LV, no pericardial effusion.  1/30 VSS, QRS remains prolonged compared to baseline, recommended for PPM Sunday per EP. BUN/Cr 56/1.81, Renal consulted - diovan & lasix dc'd, started on norvasc 2.5 qd, check Renal US, CXR, UA, and urine lytes.

## 2021-01-30 NOTE — PROGRESS NOTE ADULT - SUBJECTIVE AND OBJECTIVE BOX
Subjective: Pt states "Hello" denies any CP or SOB. No acute events overnight.     Telemetry:  SR 65  Vital Signs Last 24 Hrs  T(C): 37.2 (21 @ 07:17), Max: 37.4 (21 @ 02:59)  T(F): 99 (21 @ 07:17), Max: 99.3 (21 @ 02:59)  HR: 68 (21 07:17) (68 - 75)  BP: 157/67 (21 @ 07:17) (141/66 - 178/73)  RR: 18 (21 07:17) (18 - 18)  SpO2: 97% (21 07:17) (94% - 99%)              07:  -   @ 07:00  --------------------------------------------------------  IN: 360 mL / OUT: 1250 mL / NET: -890 mL      Daily Weight in k (2021 08:09)                        8.7    6.82  )-----------( 124      ( 2021 05:56 )             27.3     128<L>  |  94<L>  |  56<H>  ----------------------------<  115<H>  3.8   |  24  |  1.81<H>            PHYSICAL EXAM  Neurology: A&Ox3, NAD  CV : RRR+S1S2  Lungs: Respirations non-labored, B/L BS  Abdomen: Soft, NT/ND, +BSx4Q, last BM   (-)N/V/D  : Voiding without difficulty  Extremities: B/L LE edema, negative calf tenderness, +PP           MEDICATIONS  aspirin enteric coated 81 milliGRAM(s) Oral daily  atorvastatin 20 milliGRAM(s) Oral at bedtime  enoxaparin Injectable 30 milliGRAM(s) SubCutaneous daily  furosemide    Tablet 20 milliGRAM(s) Oral daily  latanoprost 0.005% Ophthalmic Solution 1 Drop(s) Both EYES at bedtime  metoprolol succinate ER 50 milliGRAM(s) Oral daily  valsartan 80 milliGRAM(s) Oral daily      Discussed with Cardiothoracic Team at AM rounds. Subjective: Pt states "Hello" denies any CP or SOB. No acute events overnight.     Telemetry:  SR 65  Vital Signs Last 24 Hrs  T(C): 37.2 (21 @ 07:17), Max: 37.4 (21 @ 02:59)  T(F): 99 (21 @ 07:17), Max: 99.3 (21 @ 02:59)  HR: 68 (21 07:17) (68 - 75)  BP: 157/67 (21 @ 07:17) (141/66 - 178/73)  RR: 18 (21 07:17) (18 - 18)  SpO2: 97% (21 07:17) (94% - 99%)              07:  -   @ 07:00  --------------------------------------------------------  IN: 360 mL / OUT: 1250 mL / NET: -890 mL      Daily Weight in k (2021 08:09)                        8.7    6.82  )-----------( 124      ( 2021 05:56 )             27.3     128<L>  |  94<L>  |  56<H>  ----------------------------<  115<H>  3.8   |  24  |  1.81<H>            PHYSICAL EXAM  Neurology: A&Ox3, NAD  CV : RRR+S1S2  Lungs: Respirations non-labored, B/L BS CTA  Abdomen: Soft, NT/ND, +BSx4Q  : Voiding without difficulty  Extremities: B/L LE no edema, negative calf tenderness, +PP                      Left groin CDI ANISH, Right groin w/DSD CDI, no bleeding, no hematoma           MEDICATIONS  aspirin enteric coated 81 milliGRAM(s) Oral daily  atorvastatin 20 milliGRAM(s) Oral at bedtime  enoxaparin Injectable 30 milliGRAM(s) SubCutaneous daily  furosemide    Tablet 20 milliGRAM(s) Oral daily  latanoprost 0.005% Ophthalmic Solution 1 Drop(s) Both EYES at bedtime  metoprolol succinate ER 50 milliGRAM(s) Oral daily  valsartan 80 milliGRAM(s) Oral daily      Discussed with Cardiothoracic Team at AM rounds.

## 2021-01-31 LAB
ANION GAP SERPL CALC-SCNC: 14 MMOL/L — SIGNIFICANT CHANGE UP (ref 5–17)
BLD GP AB SCN SERPL QL: NEGATIVE — SIGNIFICANT CHANGE UP
BUN SERPL-MCNC: 61 MG/DL — HIGH (ref 7–23)
CALCIUM SERPL-MCNC: 8.6 MG/DL — SIGNIFICANT CHANGE UP (ref 8.4–10.5)
CHLORIDE SERPL-SCNC: 99 MMOL/L — SIGNIFICANT CHANGE UP (ref 96–108)
CO2 SERPL-SCNC: 22 MMOL/L — SIGNIFICANT CHANGE UP (ref 22–31)
CREAT SERPL-MCNC: 1.82 MG/DL — HIGH (ref 0.5–1.3)
GLUCOSE SERPL-MCNC: 120 MG/DL — HIGH (ref 70–99)
HCT VFR BLD CALC: 26.9 % — LOW (ref 34.5–45)
HGB BLD-MCNC: 8.6 G/DL — LOW (ref 11.5–15.5)
MCHC RBC-ENTMCNC: 28.4 PG — SIGNIFICANT CHANGE UP (ref 27–34)
MCHC RBC-ENTMCNC: 32 GM/DL — SIGNIFICANT CHANGE UP (ref 32–36)
MCV RBC AUTO: 88.8 FL — SIGNIFICANT CHANGE UP (ref 80–100)
NRBC # BLD: 0 /100 WBCS — SIGNIFICANT CHANGE UP (ref 0–0)
PLATELET # BLD AUTO: 135 K/UL — LOW (ref 150–400)
POTASSIUM SERPL-MCNC: 4.1 MMOL/L — SIGNIFICANT CHANGE UP (ref 3.5–5.3)
POTASSIUM SERPL-SCNC: 4.1 MMOL/L — SIGNIFICANT CHANGE UP (ref 3.5–5.3)
RBC # BLD: 3.03 M/UL — LOW (ref 3.8–5.2)
RBC # FLD: 12.2 % — SIGNIFICANT CHANGE UP (ref 10.3–14.5)
RH IG SCN BLD-IMP: POSITIVE — SIGNIFICANT CHANGE UP
SODIUM SERPL-SCNC: 135 MMOL/L — SIGNIFICANT CHANGE UP (ref 135–145)
WBC # BLD: 6.3 K/UL — SIGNIFICANT CHANGE UP (ref 3.8–10.5)
WBC # FLD AUTO: 6.3 K/UL — SIGNIFICANT CHANGE UP (ref 3.8–10.5)

## 2021-01-31 PROCEDURE — 71045 X-RAY EXAM CHEST 1 VIEW: CPT | Mod: 26

## 2021-01-31 PROCEDURE — 93010 ELECTROCARDIOGRAM REPORT: CPT

## 2021-01-31 PROCEDURE — 33208 INSRT HEART PM ATRIAL & VENT: CPT | Mod: KX

## 2021-01-31 PROCEDURE — 93010 ELECTROCARDIOGRAM REPORT: CPT | Mod: 76

## 2021-01-31 PROCEDURE — 99232 SBSQ HOSP IP/OBS MODERATE 35: CPT

## 2021-01-31 RX ORDER — ACETAMINOPHEN 500 MG
650 TABLET ORAL EVERY 6 HOURS
Refills: 0 | Status: DISCONTINUED | OUTPATIENT
Start: 2021-01-31 | End: 2021-02-02

## 2021-01-31 RX ORDER — ENOXAPARIN SODIUM 100 MG/ML
30 INJECTION SUBCUTANEOUS DAILY
Refills: 0 | Status: DISCONTINUED | OUTPATIENT
Start: 2021-01-31 | End: 2021-02-01

## 2021-01-31 RX ADMIN — ATORVASTATIN CALCIUM 20 MILLIGRAM(S): 80 TABLET, FILM COATED ORAL at 21:08

## 2021-01-31 RX ADMIN — LATANOPROST 1 DROP(S): 0.05 SOLUTION/ DROPS OPHTHALMIC; TOPICAL at 21:10

## 2021-01-31 RX ADMIN — ENOXAPARIN SODIUM 30 MILLIGRAM(S): 100 INJECTION SUBCUTANEOUS at 17:06

## 2021-01-31 RX ADMIN — Medication 650 MILLIGRAM(S): at 21:08

## 2021-01-31 RX ADMIN — Medication 50 MILLIGRAM(S): at 04:55

## 2021-01-31 RX ADMIN — CHLORHEXIDINE GLUCONATE 1 APPLICATION(S): 213 SOLUTION TOPICAL at 04:55

## 2021-01-31 RX ADMIN — Medication 81 MILLIGRAM(S): at 17:06

## 2021-01-31 RX ADMIN — AMLODIPINE BESYLATE 2.5 MILLIGRAM(S): 2.5 TABLET ORAL at 04:55

## 2021-01-31 NOTE — PROGRESS NOTE ADULT - SUBJECTIVE AND OBJECTIVE BOX
Subjective: Pt states "Hello" denies any CP or SOB. No acute events overnight.     Telemetry:  SR 1st degree 76  Vital Signs Last 24 Hrs  T(C): 37 (21 @ 04:22), Max: 37.2 (21 @ 07:17)  T(F): 98.6 (21 @ 04:22), Max: 99 (21 @ 07:17)  HR: 70 (21 @ 04:22) (64 - 82)  BP: 146/67 (21 @ 04:22) (134/60 - 159/70)  RR: 18 (21 @ 04:22) (17 - 18)  SpO2: 98% (21 @ 04:22) (97% - 100%)              07:01  -   @ 07:00  --------------------------------------------------------  IN: 840 mL / OUT: 1500 mL / NET: -660 mL          Daily Weight in k.2 (2021 04:22)                        8.6    6.30  )-----------( 135      ( 2021 06:14 )             26.9     135  |  99  |  61<H>  ----------------------------<  120<H>  4.1   |  22  |  1.82<H>        PHYSICAL EXAM  Neurology: A&Ox3, NAD  CV : RRR+S1S2  Lungs: Respirations non-labored, B/L BS CTA  Abdomen: Soft, NT/ND, +BSx4Q  : Voiding without difficulty  Extremities: B/L LE no edema, negative calf tenderness, +PP                      Left groin CDI ANISH, Right groin w/DSD CDI, no bleeding, no hematoma          MEDICATIONS  amLODIPine   Tablet 2.5 milliGRAM(s) Oral daily  aspirin enteric coated 81 milliGRAM(s) Oral daily  atorvastatin 20 milliGRAM(s) Oral at bedtime  ceFAZolin   IVPB 2000 milliGRAM(s) IV Intermittent once  latanoprost 0.005% Ophthalmic Solution 1 Drop(s) Both EYES at bedtime  metoprolol succinate ER 50 milliGRAM(s) Oral daily        Discussed with Cardiothoracic Team at AM rounds.

## 2021-01-31 NOTE — PROGRESS NOTE ADULT - PROBLEM SELECTOR PLAN 1
Continue asa 81 daily and atorvastatin 20 HS  Check daily EKG   Disposition: Plan for home Mon if medically cleared

## 2021-01-31 NOTE — PROGRESS NOTE ADULT - ASSESSMENT
This is a 89 y/o female with h/o anemia, arrythmia, HTN, HLD, ASHD, carotid stenosis, MR and severe aortic stenosis with c/o progressively worsening dyspnea on exertion is scheduled for TAVR on 1/27/2021.       1/27 right transfemoral TAVR 29 mm core valve  NL EF, LBBB at baseline, ECG post op: patient doc into 30-40's, Rate increased to 60 and 20 output at that time now currently SR 50 - 60s, pacer settings changed to rate of 40 & output of 20. Pt now transferred to SDU, TVP to remain in place.   1/28   TVP,  started home dig and toprol 25   qd   now in afib  80   TTE done  1/29 Toproil increased to 50, and diovan started by Dr Sanders. TTE: minimal paravalvular AR, hyperdynamic LV, no pericardial effusion.  1/30 VSS, QRS remains prolonged compared to baseline, recommended for PPM Sunday per EP. BUN/Cr 56/1.81, Renal consulted - diovan & lasix dc'd, started on norvasc 2.5 qd, check Renal US, CXR, UA, and urine lytes.   1/31 VSS, NPO for PPM today.    This is a 89 y/o female with h/o anemia, arrythmia, HTN, HLD, ASHD, carotid stenosis, MR and severe aortic stenosis with c/o progressively worsening dyspnea on exertion is scheduled for TAVR on 1/27/2021.       1/27 right transfemoral TAVR 29 mm core valve  NL EF, LBBB at baseline, ECG post op: patient doc into 30-40's, Rate increased to 60 and 20 output at that time now currently SR 50 - 60s, pacer settings changed to rate of 40 & output of 20. Pt now transferred to SDU, TVP to remain in place.   1/28   TVP,  started home dig and toprol 25   qd   now in afib  80   TTE done  1/29 Toproil increased to 50, and diovan started by Dr Sanders. TTE: minimal paravalvular AR, hyperdynamic LV, no pericardial effusion.  1/30 VSS, QRS remains prolonged compared to baseline, recommended for PPM Sunday per EP. BUN/Cr 56/1.81, Renal consulted - diovan & lasix dc'd, started on norvasc 2.5 qd, check Renal US, CXR, UA, and urine lytes.   1/31 VSS, NPO for PPM today. Plan to transfuse 1U PRBC post PPM today per Dr. Sanders. BUN/Cr 61/1.8 unchanged.    This is a 89 y/o female with h/o anemia, arrythmia, HTN, HLD, ASHD, carotid stenosis, MR and severe aortic stenosis with c/o progressively worsening dyspnea on exertion is scheduled for TAVR on 1/27/2021.       1/27 right transfemoral TAVR 29 mm core valve  NL EF, LBBB at baseline, ECG post op: patient doc into 30-40's, Rate increased to 60 and 20 output at that time now currently SR 50 - 60s, pacer settings changed to rate of 40 & output of 20. Pt now transferred to SDU, TVP to remain in place.   1/28   TVP,  started home dig and toprol 25   qd   now in afib  80   TTE done  1/29 Toproil increased to 50, and diovan started by Dr Sanders. TTE: minimal paravalvular AR, hyperdynamic LV, no pericardial effusion.  1/30 VSS, QRS remains prolonged compared to baseline, recommended for PPM Sunday per EP. BUN/Cr 56/1.81, Renal consulted - diovan & lasix dc'd, started on norvasc 2.5 qd, check Renal US, CXR, UA, and urine lytes.   1/31 VSS, s/p [employer] PPM (DDD 60 - 130). Transfuse 1U PRBC post PPM today per Dr. Sanders. BUN/Cr 61/1.8 unchanged.    This is a 89 y/o female with h/o anemia, arrythmia, HTN, HLD, ASHD, carotid stenosis, MR and severe aortic stenosis with c/o progressively worsening dyspnea on exertion is scheduled for TAVR on 1/27/2021.       1/27 right transfemoral TAVR 29 mm core valve  NL EF, LBBB at baseline, ECG post op: patient doc into 30-40's, Rate increased to 60 and 20 output at that time now currently SR 50 - 60s, pacer settings changed to rate of 40 & output of 20. Pt now transferred to SDU, TVP to remain in place.   1/28   TVP,  started home dig and toprol 25   qd   now in afib  80   TTE done  1/29 Toproil increased to 50, and diovan started by Dr Sanders. TTE: minimal paravalvular AR, hyperdynamic LV, no pericardial effusion.  1/30 VSS, QRS remains prolonged compared to baseline, recommended for PPM Sunday per EP. BUN/Cr 56/1.81, Renal consulted - diovan & lasix dc'd, started on norvasc 2.5 qd, check Renal US, CXR, UA, and urine lytes.   1/31 VSS, s/p [employer] PPM (DDD 60 - 130). Post implant CXR no PTX. Transfuse 1U PRBC post PPM today per Dr. Sanders. BUN/Cr 61/1.8 unchanged.

## 2021-01-31 NOTE — PROGRESS NOTE ADULT - SUBJECTIVE AND OBJECTIVE BOX
The patient is a 89y Female seen today at [facility]. No new complaints, s/p PPM placement. Reports improved breathing and good voids.    REVIEW OF SYSTEMS:  Denies any nausea, vomiting, diarrhea, fevers or chills. Denies chest pain, SOB, focal weakness, hematuria or dysuria.  Good oral intake and denies fatigue or weakness.  All other pertinent systems are reviewed and are negative.    MEDICATIONS  (STANDING):  amLODIPine   Tablet 2.5 milliGRAM(s) Oral daily  aspirin enteric coated 81 milliGRAM(s) Oral daily  atorvastatin 20 milliGRAM(s) Oral at bedtime  ceFAZolin   IVPB 2000 milliGRAM(s) IV Intermittent once  enoxaparin Injectable 30 milliGRAM(s) SubCutaneous daily  latanoprost 0.005% Ophthalmic Solution 1 Drop(s) Both EYES at bedtime  metoprolol succinate ER 50 milliGRAM(s) Oral daily    VITAL:  T(F): 98.2 (01-31-21 @ 14:15), Max: 99 (01-31-21 @ 07:08)  HR: 65 (01-31-21 @ 14:15) (62 - 82)  BP: 149/66 (01-31-21 @ 14:15) (131/60 - 163/67)  SpO2: 98% (01-31-21 @ 14:15)  Weight (kg): 89.2 (01-31 @ 08:53)    I and O's:    01-30 @ 07:01 - 01-31 @ 07:00  --------------------------------------------------------  IN: 840 mL / OUT: 1500 mL / NET: -660 mL    01-31 @ 07:01 - 01-31 @ 14:34  --------------------------------------------------------  IN: 660 mL / OUT: 550 mL / NET: 110 mL      PHYSICAL EXAM:  General:  alert, cooperative and no distress  Lungs: clear to auscultation bilaterally  Heart: normal S1/S2  Abdomen: soft, non-tender not distended, + BS  Extremities: no clubbing, cyanosis or edema  Urologic: no puentes  Neurologic: Grossly normal    LABS:                        8.6    6.30  )-----------( 135      ( 31 Jan 2021 06:14 )             26.9     01-31    135  |  99  |  61<H>  ----------------------------<  120<H>  4.1   |  22  |  1.82<H>    Ca    8.6      31 Jan 2021 06:14      Urine Studies:  Urinalysis Basic - ( 30 Jan 2021 13:01 )  Gluc: x / Ketone: Negative  / Bili: Negative / Urobili: Negative   Blood: x / Protein: Trace / Nitrite: Negative   Leuk Esterase: Negative / RBC: 30 /hpf / WBC 3 /HPF   Sq Epi: x / Non Sq Epi: 1 /hpf / Bacteria: Few    Osmolality, Random Urine: 221 mosm/Kg (01-30 @ 15:32)  Sodium, Random Urine: <35 mmol/L (01-30 @ 13:02)  Creatinine, Random Urine: 54 mg/dL (01-30 @ 13:02)  Protein/Creatinine Ratio Calculation: 0.3 Ratio (01-30 @ 13:02)    RADIOLOGY & ADDITIONAL STUDIES:  `< from: Xray Chest 1 View- PORTABLE-Urgent (Xray Chest 1 View- PORTABLE-Urgent .) (01.31.21 @ 11:51) >  FINDINGS: The cardiac silhouette is enlarged, unchanged.. The patient is status post TAVR. There is a left-sided dual-lead pacemaker in good position. No pneumothorax is seen. No focal consolidation or pleural effusion is seen.  IMPRESSION: Left-sided pacemaker in good position with no pneumothorax seen.  < end of copied text >    ASSESSMENT  The patient is a 89y Female with CAD, HTN, PVD, MR and severe aortic stenosis p/w worsening RIVAS. She is now s/p TAVR followed by acute on chronic kidney injury and PPM placement.  - CKD: likely stage 3 with baseline creatinine ~1.4 and minimal proteinuria.  - FELICITAS: nonoliguric. Likely prerenal due to diuresis, relative hypotension (in setting of ARB) +/-  AFib. Renal fxn stable today.  - hyponatremia: Likely hypovolemic, Improved  - HTN: BP improving.    RECOMMENDATIONS:  - check renal and bladder US  - would continue to hold diuretics today  - continue to hold ARB  - a/w discharge planning for tomorrow if all stable        - reassess diuretic in AM  - please dose any new medications for a CrCl of ~ 20 cc/min  - avoid NSAIDs/nephrotoxins as able    Gregory Griggs M.D.  [facility], [facility]  [id number]  [place] [address] [phone]

## 2021-01-31 NOTE — CHART NOTE - NSCHARTNOTEFT_GEN_A_CORE
Procedure Note    Preoperative diagnosis  LBBB with wide QRS of 180 msec post TAVR  First degree AV block 220 msec    Post operative diagnosis  Same    Procedure  [employer] dual chamber pacemaker implant in left pectoral region via subclavian vein    Indication:  This 89 year old lady with aortic stenosis underwent TAVR several days ago. She had LBBB with QRS of 140 ms preprocedure. Post procedure this widened to 180 msec and persisted. She also has first degree AV block. There are features that predict late AV block post TAVR. Hence, she is referred for dual chamber pacemaker implant.    Findings    Cephalic vein was too small. Left subclavian vein accessed. [employer] dual chamber pacemaker implant in left pectoral region.  Set DDD  with Rhythmique feature active to allow native conduction as much as possible.   No immediate complications.    Plan:  CXR stat to exclude PTx  Pacer check in am and home with follow up in pacemaker clinic in 10 days.    Lauri Dupree MD  Attending Cardiac Electrophysiologist

## 2021-01-31 NOTE — PROGRESS NOTE ADULT - PROBLEM SELECTOR PLAN 3
EP following, Dr. Dupree  Check daily EKG  NPO p MN for PPM implant Sun 1/31  Check type & screen  Hold SQ LVX EP following, Dr. Dupree  s/p 1/31 [employer] PPM (DDD 60 - 130)  STAT EKG and STAT CXR to r/o PTX  Can remove PPM pressure dressing in AM  Can resume on asa 81 and SQ LVX dvt ppx today per Dr. Dupree  Check daily EKG

## 2021-02-01 ENCOUNTER — NON-APPOINTMENT (OUTPATIENT)
Age: 86
End: 2021-02-01

## 2021-02-01 LAB
ALBUMIN SERPL ELPH-MCNC: 3.4 G/DL — SIGNIFICANT CHANGE UP (ref 3.3–5)
ALP SERPL-CCNC: 59 U/L — SIGNIFICANT CHANGE UP (ref 40–120)
ALT FLD-CCNC: 18 U/L — SIGNIFICANT CHANGE UP (ref 10–45)
ANION GAP SERPL CALC-SCNC: 12 MMOL/L — SIGNIFICANT CHANGE UP (ref 5–17)
AST SERPL-CCNC: 20 U/L — SIGNIFICANT CHANGE UP (ref 10–40)
BILIRUB SERPL-MCNC: 0.5 MG/DL — SIGNIFICANT CHANGE UP (ref 0.2–1.2)
BUN SERPL-MCNC: 54 MG/DL — HIGH (ref 7–23)
CALCIUM SERPL-MCNC: 8.7 MG/DL — SIGNIFICANT CHANGE UP (ref 8.4–10.5)
CHLORIDE SERPL-SCNC: 99 MMOL/L — SIGNIFICANT CHANGE UP (ref 96–108)
CO2 SERPL-SCNC: 24 MMOL/L — SIGNIFICANT CHANGE UP (ref 22–31)
CREAT SERPL-MCNC: 1.52 MG/DL — HIGH (ref 0.5–1.3)
GLUCOSE SERPL-MCNC: 120 MG/DL — HIGH (ref 70–99)
HCT VFR BLD CALC: 34 % — LOW (ref 34.5–45)
HGB BLD-MCNC: 10.9 G/DL — LOW (ref 11.5–15.5)
MAGNESIUM SERPL-MCNC: 2.6 MG/DL — SIGNIFICANT CHANGE UP (ref 1.6–2.6)
MCHC RBC-ENTMCNC: 29.1 PG — SIGNIFICANT CHANGE UP (ref 27–34)
MCHC RBC-ENTMCNC: 32.1 GM/DL — SIGNIFICANT CHANGE UP (ref 32–36)
MCV RBC AUTO: 90.7 FL — SIGNIFICANT CHANGE UP (ref 80–100)
NRBC # BLD: 0 /100 WBCS — SIGNIFICANT CHANGE UP (ref 0–0)
PHOSPHATE SERPL-MCNC: 4.2 MG/DL — SIGNIFICANT CHANGE UP (ref 2.5–4.5)
PLATELET # BLD AUTO: 148 K/UL — LOW (ref 150–400)
POTASSIUM SERPL-MCNC: 4.7 MMOL/L — SIGNIFICANT CHANGE UP (ref 3.5–5.3)
POTASSIUM SERPL-SCNC: 4.7 MMOL/L — SIGNIFICANT CHANGE UP (ref 3.5–5.3)
PROT SERPL-MCNC: 6.9 G/DL — SIGNIFICANT CHANGE UP (ref 6–8.3)
RBC # BLD: 3.75 M/UL — LOW (ref 3.8–5.2)
RBC # FLD: 12.7 % — SIGNIFICANT CHANGE UP (ref 10.3–14.5)
SODIUM SERPL-SCNC: 135 MMOL/L — SIGNIFICANT CHANGE UP (ref 135–145)
WBC # BLD: 6.61 K/UL — SIGNIFICANT CHANGE UP (ref 3.8–10.5)
WBC # FLD AUTO: 6.61 K/UL — SIGNIFICANT CHANGE UP (ref 3.8–10.5)

## 2021-02-01 PROCEDURE — 76770 US EXAM ABDO BACK WALL COMP: CPT | Mod: 26

## 2021-02-01 PROCEDURE — 99232 SBSQ HOSP IP/OBS MODERATE 35: CPT

## 2021-02-01 PROCEDURE — 71046 X-RAY EXAM CHEST 2 VIEWS: CPT | Mod: 26

## 2021-02-01 PROCEDURE — 93010 ELECTROCARDIOGRAM REPORT: CPT

## 2021-02-01 RX ORDER — AMOXICILLIN AND CLAVULANATE POTASSIUM 500; 125 MG/1; MG/1
500-125 TABLET, FILM COATED ORAL
Qty: 14 | Refills: 0 | Status: DISCONTINUED | COMMUNITY
Start: 2020-11-06

## 2021-02-01 RX ORDER — MONTELUKAST 10 MG/1
10 TABLET, FILM COATED ORAL
Qty: 30 | Refills: 0 | Status: DISCONTINUED | COMMUNITY
Start: 2020-10-15

## 2021-02-01 RX ORDER — TRAVOPROST 0.04 MG/ML
0 SOLUTION OPHTHALMIC
Qty: 2 | Refills: 0 | Status: DISCONTINUED | COMMUNITY
Start: 2020-12-28

## 2021-02-01 RX ADMIN — Medication 81 MILLIGRAM(S): at 16:12

## 2021-02-01 RX ADMIN — AMLODIPINE BESYLATE 2.5 MILLIGRAM(S): 2.5 TABLET ORAL at 05:38

## 2021-02-01 RX ADMIN — Medication 50 MILLIGRAM(S): at 05:39

## 2021-02-01 RX ADMIN — LATANOPROST 1 DROP(S): 0.05 SOLUTION/ DROPS OPHTHALMIC; TOPICAL at 20:46

## 2021-02-01 RX ADMIN — Medication 650 MILLIGRAM(S): at 17:35

## 2021-02-01 RX ADMIN — ATORVASTATIN CALCIUM 20 MILLIGRAM(S): 80 TABLET, FILM COATED ORAL at 20:46

## 2021-02-01 NOTE — PROGRESS NOTE ADULT - PROBLEM SELECTOR PLAN 4
Renal consulted  Hold nephrotoxic agents - Diovan and lasix dc'd  Check CXR and Renal US  Check UA and urine lytes  Trend BUN/Cr on daily BMP

## 2021-02-01 NOTE — PROGRESS NOTE ADULT - PROVIDER SPECIALTY LIST ADULT
Electrophysiology
Electrophysiology
Nephrology
Nephrology
Structural Heart
Structural Heart
CT Surgery
Structural Heart
CT Surgery

## 2021-02-01 NOTE — PROGRESS NOTE ADULT - SUBJECTIVE AND OBJECTIVE BOX
24H hour events: Pt without complaints, s/p PPM implant yesterday.     MEDICATIONS:  amLODIPine   Tablet 2.5 milliGRAM(s) Oral daily  aspirin enteric coated 81 milliGRAM(s) Oral daily  metoprolol succinate ER 50 milliGRAM(s) Oral daily  ceFAZolin   IVPB 2000 milliGRAM(s) IV Intermittent once  acetaminophen   Tablet .. 650 milliGRAM(s) Oral every 6 hours PRN  atorvastatin 20 milliGRAM(s) Oral at bedtime  latanoprost 0.005% Ophthalmic Solution 1 Drop(s) Both EYES at bedtime      REVIEW OF SYSTEMS:  Complete 10point ROS negative.    PHYSICAL EXAM:  T(C): 36.8 (02-01-21 @ 08:07), Max: 37.1 (01-31-21 @ 23:13)  HR: 67 (02-01-21 @ 08:07) (62 - 80)  BP: 148/67 (02-01-21 @ 08:07) (131/60 - 170/73)  RR: 18 (02-01-21 @ 08:07) (18 - 19)  SpO2: 98% (02-01-21 @ 08:07) (96% - 99%)  Wt(kg): --  I&O's Summary    31 Jan 2021 07:01  -  01 Feb 2021 07:00  --------------------------------------------------------  IN: 900 mL / OUT: 1250 mL / NET: -350 mL    01 Feb 2021 07:01  -  01 Feb 2021 12:41  --------------------------------------------------------  IN: 240 mL / OUT: 1100 mL / NET: -860 mL      Appearance: NAD, OOB sitting up in chair	  Skin: Left chest wall PPM surgical incision site C/D/I without hematoma       LABS:	 	    CBC Full  -  ( 01 Feb 2021 08:33 )  WBC Count : 6.61 K/uL  Hemoglobin : 10.9 g/dL  Hematocrit : 34.0 %  Platelet Count - Automated : 148 K/uL  Mean Cell Volume : 90.7 fl  Mean Cell Hemoglobin : 29.1 pg  Mean Cell Hemoglobin Concentration : 32.1 gm/dL  Auto Neutrophil # : x  Auto Lymphocyte # : x  Auto Monocyte # : x  Auto Eosinophil # : x  Auto Basophil # : x  Auto Neutrophil % : x  Auto Lymphocyte % : x  Auto Monocyte % : x  Auto Eosinophil % : x  Auto Basophil % : x    02-01    135  |  99  |  54<H>  ----------------------------<  120<H>  4.7   |  24  |  1.52<H>  01-31    135  |  99  |  61<H>  ----------------------------<  120<H>  4.1   |  22  |  1.82<H>    Ca    8.7      01 Feb 2021 08:33  Ca    8.6      31 Jan 2021 06:14  Phos  4.2     02-01  Mg     2.6     02-01    TPro  6.9  /  Alb  3.4  /  TBili  0.5  /  DBili  x   /  AST  20  /  ALT  18  /  AlkPhos  59  02-01      TELEMETRY: SR at 60-80's   	    ECG: SR with 1st degree AV delay, wide LBBB    	  < from: TTE with Doppler (w/Cont) (01.28.21 @ 08:10) >  Dimensions:    Normal Values:  LA:     3.8    2.0 - 4.0 cm  Ao:            2.0 - 3.8 cm  SEPTUM: 1.5    0.6 - 1.2 cm  PWT:    1.5    0.6 - 1.1 cm  LVIDd:  5.0    3.0 - 5.6 cm  LVIDs:  2.4    1.8 - 4.0 cm  Derived variables:  LVMI: 166 g/m2  RWT: 0.60  Fractional short: 52 %  EF (Visual Estimate): 75-80 %  Doppler Peak Velocity (m/sec): AoV=2.4  ------------------------------------------------------------------------  Observations:  Mitral Valve: Normal mitral valve. Mild mitral  regurgitation.  Aortic Valve/Aorta: Transcatheter aortic valve replacement.  Peak transaortic valve gradient equals 25 mm Hg, mean  transaortic valve gradient equals 14 mm Hg, which is  probably normal in the presence of a transcatheter aortic  valve replacement. NEELIMA 2.4sqcm Minimal  paravalvular aortic  regurgitation. Peak left ventricular outflow tract gradient  equals 13 mm Hg, mean gradient is equal to 7 mm Hg, LVOT  velocity time integral equals 35 cm.  LVOT diameter: 2 cm.  Left Atrium: Severely dilated left atrium.  LA volume index  = 55 cc/m2.  Left Ventricle: Hyperdynamic left ventricular systolic  function. Moderate concentric left ventricular hypertrophy.  Reversal of the E-A waves of the mitral inflow pattern  consistent with reduced compliance of the left ventricle.  Right Heart: Normal right atrium. Normal right ventricular  size and function. Normal tricuspid valve. Minimal  tricuspid regurgitation. Normal pulmonic valve.  Pericardium/Pleura: Thickened pericardium.  Hemodynamic: Estimated right atrial pressure is 8 mm Hg.  Estimated right ventricular systolic pressure equals 28 mm  Hg, assuming right atrial pressure equals 8 mm Hg,consistent with normal pulmonary pressures.  ------------------------------------------------------------------------  Conclusions:  1. Transcatheter aortic valve replacement. Peak transaortic  valve gradient equals 25 mm Hg, mean transaortic valve  gradientequals 14 mm Hg, which is probably normal in the  presence of a transcatheter aortic valve replacement. NEELIMA  2.4sqcm Minimal  paravalvular aortic regurgitation.  2. Severely dilated left atrium.  LA volume index = 55  cc/m2.  3. Moderate concentric left ventricular hypertrophy.  4. Hyperdynamic left ventricular systolic function.  5. Normal right ventricular size and function.  6. Thickened pericardium.  *** No previous Echo exam.    < end of copied text >      PA/LA CXR: Good leads placement, no pneumothorax.

## 2021-02-01 NOTE — PROGRESS NOTE ADULT - ASSESSMENT
89 year old female h/o anemia, pre-existing LBBB (QRSd 148ms), 1st degree AVB, HTN, HLD, ASHD, carotid stenosis, mild-mod MR and severe AS s/p TAVR on 1/27/2021 c/b progressive widening of LBBB. Now s/p DC PPM (C) implant 1/31/21   1) Baseline LBBB--progressive widening of QRS  2) Severe AS s/p TAVR  -Post PPM teaching enforced with patient and her daughter (Shani Varghese) via phone  -Post-op PPM interrogated by [employer] rep this am; normal device function  -No Lovenox/Heparin products post PPM implant  -Follow up wound check in device clinic in 10-14 days (please check on HIE tab for appointment time)  -EPS will sign off, reconsult as [place]  [id number]

## 2021-02-01 NOTE — PROGRESS NOTE ADULT - PROBLEM SELECTOR PLAN 3
EP following, Dr. Dupree  s/p 1/31 [employer] PPM (DDD 60 - 130)  STAT EKG and STAT CXR to r/o PTX  Can remove PPM pressure dressing in AM  Can resume on asa 81 and SQ LVX dvt ppx today per Dr. Dupree  Check daily EKG

## 2021-02-01 NOTE — PROGRESS NOTE ADULT - PROBLEM SELECTOR PROBLEM 1
S/P TAVR (transcatheter aortic valve replacement)

## 2021-02-01 NOTE — PROGRESS NOTE ADULT - SUBJECTIVE AND OBJECTIVE BOX
NEPHROLOGY-[facility] [phone]        Patient seen and examined         MEDICATIONS  (STANDING):  amLODIPine   Tablet 2.5 milliGRAM(s) Oral daily  aspirin enteric coated 81 milliGRAM(s) Oral daily  atorvastatin 20 milliGRAM(s) Oral at bedtime  ceFAZolin   IVPB 2000 milliGRAM(s) IV Intermittent once  latanoprost 0.005% Ophthalmic Solution 1 Drop(s) Both EYES at bedtime  metoprolol succinate ER 50 milliGRAM(s) Oral daily      VITAL:  T(C): , Max: 37.1 (21 @ 23:13)  T(F): , Max: 98.7 (21 @ 23:13)  HR: 67 (21 @ 08:07)  BP: 148/67 (21 @ 08:07)  BP(mean): 105 (21 @ 03:08)  RR: 18 (21 @ 08:07)  SpO2: 98% (21 @ 08:07)  Wt(kg): --    I and O's:     @ 07:01  -   @ 07:00  --------------------------------------------------------  IN: 900 mL / OUT: 1250 mL / NET: -350 mL     @ 07:01  -   @ 12:43  --------------------------------------------------------  IN: 240 mL / OUT: 1100 mL / NET: -860 mL          PHYSICAL EXAM:    Constitutional: NAD  Neck:  No JVD  Respiratory: CTAB/L  Cardiovascular: S1 and S2  Gastrointestinal: BS+, soft, NT/ND  Extremities: No peripheral edema  Neurological: A/O x 3, no focal deficits  Psychiatric: Normal mood, normal affect  : No Serna  Skin: No rashes  Access: Not applicable    LABS:                        10.9   6.61  )-----------( 148      ( 2021 08:33 )             34.0     -    135  |  99  |  54<H>  ----------------------------<  120<H>  4.7   |  24  |  1.52<H>    Ca    8.7      2021 08:33  Phos  4.2       Mg     2.6         TPro  6.9  /  Alb  3.4  /  TBili  0.5  /  DBili  x   /  AST  20  /  ALT  18  /  AlkPhos  59            Urine Studies:  Urinalysis Basic - ( 2021 13:01 )    Color: Light Yellow / Appearance: Slightly Turbid / S.010 / pH: x  Gluc: x / Ketone: Negative  / Bili: Negative / Urobili: Negative   Blood: x / Protein: Trace / Nitrite: Negative   Leuk Esterase: Negative / RBC: 30 /hpf / WBC 3 /HPF   Sq Epi: x / Non Sq Epi: 1 /hpf / Bacteria: Few      Osmolality, Random Urine: 221 mosm/Kg ( @ 15:32)  Sodium, Random Urine: <35 mmol/L ( @ 13:02)  Creatinine, Random Urine: 54 mg/dL ( @ 13:02)  Protein/Creatinine Ratio Calculation: 0.3 Ratio ( @ 13:02)        RADIOLOGY & ADDITIONAL STUDIES:

## 2021-02-01 NOTE — PROGRESS NOTE ADULT - SUBJECTIVE AND OBJECTIVE BOX
*****Structural Heart Team*****    Subjective:    Patient is resting comfortably, offering no complaints. She is S/P PPM placement yesterday for Widened LBBB and SD interval.   She is also being followed by renal for elevated creatinine post TAVR.    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          T(C): 36.8 (02-01-21 @ 08:07), Max: 37.1 (01-31-21 @ 23:13)  HR: 67 (02-01-21 @ 08:07) (62 - 80)  BP: 148/67 (02-01-21 @ 08:07) (131/60 - 170/73)  RR: 18 (02-01-21 @ 08:07) (18 - 19)  SpO2: 98% (02-01-21 @ 08:07) (96% - 99%)  Wt(kg): --  01-31 @ 07:01  -  02-01 @ 07:00  --------------------------------------------------------  IN: 900 mL / OUT: 1250 mL / NET: -350 mL    02-01 @ 07:01  -  02-01 @ 09:24  --------------------------------------------------------  IN: 240 mL / OUT: 0 mL / NET: 240 mL      MEDICATIONS  (STANDING):  amLODIPine   Tablet 2.5 milliGRAM(s) Oral daily  aspirin enteric coated 81 milliGRAM(s) Oral daily  atorvastatin 20 milliGRAM(s) Oral at bedtime  ceFAZolin   IVPB 2000 milliGRAM(s) IV Intermittent once  enoxaparin Injectable 30 milliGRAM(s) SubCutaneous daily  latanoprost 0.005% Ophthalmic Solution 1 Drop(s) Both EYES at bedtime  metoprolol succinate ER 50 milliGRAM(s) Oral daily    MEDICATIONS  (PRN):  acetaminophen   Tablet .. 650 milliGRAM(s) Oral every 6 hours PRN Temp greater or equal to 38.5C (101.3F), Mild Pain (1 - 3)      Review of Symptoms:  Constitutional: Awake, Alert, Follows commands  Respiratory: SOB/RIVAS on admission  Cardiac: Denies CP, Denies Palpitations  Gastrointestinal: Denies Pain, Denies N/V, tolerating po intake  Vascular: Negative  Extremities: + Edema, No joint pain or swelling  Neurological: Negative  Endocrine: No heat or cold intolerance, No excessive thirst  Heme/Onc: Negative    Exam:  General: A/Ox3, NAD  HEENT: Supple, No JVD, Trachea midline, no masses  Pulmonary: CTAB, = Chest Excursion, no accessory muscle use  Cor: S1S2, RRR, No murmur or rub  ECG: SR  Groin/Wound:  Gastrointestinal: Soft, NT/ND, + Bowel Sounds  Neuro: = motor and sensory B/L, No focal deficits  Vascular: Trace edema  Extremities: No Joint pain or swelling  Skin: Warm/Dry/Normal color, Normal turgor, no rashes                          10.9   6.61  )-----------( 148      ( 01 Feb 2021 08:33 )             34.0   02-01    135  |  99  |  54<H>  ----------------------------<  120<H>  4.7   |  24  |  1.52<H>    Ca    8.7      01 Feb 2021 08:33  Phos  4.2     02-01  Mg     2.6     02-01    TPro  6.9  /  Alb  3.4  /  TBili  0.5  /  DBili  x   /  AST  20  /  ALT  18  /  AlkPhos  59  02-01      Imaging Reviewed:    Post Op TTE:  < from: TTE with Doppler (w/Cont) (01.28.21 @ 08:10) >  EF (Visual Estimate): 75-80 %  Doppler Peak Velocity (m/sec): AoV=2.4  ------------------------------------------------------------------------  Observations:  Mitral Valve: Normal mitral valve. Mild mitral  regurgitation.  Aortic Valve/Aorta: Transcatheter aortic valve replacement.  Peak transaortic valve gradient equals 25 mm Hg, mean  transaortic valve gradient equals 14 mm Hg, which is  probably normal in the presence of a transcatheter aortic  valve replacement. NEELIMA 2.4sqcm Minimal  paravalvular aortic  regurgitation. Peak left ventricular outflow tract gradient  equals 13 mm Hg, mean gradient is equal to 7 mm Hg, LVOT  velocity time integral equals 35 cm.  LVOT diameter: 2 cm.  Left Atrium: Severely dilated left atrium.  LA volume index  = 55 cc/m2.  Left Ventricle: Hyperdynamic left ventricular systolic  function. Moderate concentric left ventricular hypertrophy.  Reversal of the E-A waves of the mitral inflow pattern  consistent with reduced compliance of the left ventricle.  Right Heart: Normal right atrium. Normal right ventricular  size and function. Normal tricuspid valve. Minimal  tricuspid regurgitation. Normal pulmonic valve.  Pericardium/Pleura: Thickened pericardium.  Hemodynamic: Estimated right atrial pressure is 8 mm Hg.  Estimated right ventricular systolic pressure equals 28 mm  Hg, assuming right atrial pressure equals 8 mm Hg,  consistent with normal pulmonary pressures.    < end of copied text >        Assesment/Plan:  89 Female S/P TAVR, S/P PPM/ Acute on Chronic Kidney Injury, Chronic Diastolic Heart Failure  1.) S/P TAVR: ASA 81 mg po daily, Due to age will not give Plavix, Continue to Monitor Groins. Ambulate as tolerated. Post op TTE looks ok, Slightly hyperdynamic, but valve seated well and functioning normally.  2.) New LBBB: Patient had PPM placed yesterday due to prolonged SD Interval and LBBB. Will not need MCOT on discharge  3.) Actue Kidney Injury: Continue to monitor creatinine. Renal input appreciated.  4.) Chronic Diastolic Heart Failure: Monitor Daily weight. Patient is back on metoprolol. May want to consider restarting daily maintenance furosemide 20 mg daily   5.) Discharge Plan: Patient is to follow up with Dr. Sanders in 1 week post discharge. She should then follow up with the [facility]  in 30 days, with a repeat Transthoracic Echo to be done at that visit.    BAL [place]  [id number] *****Structural Heart Team*****    Subjective:    Patient is resting comfortably, offering no complaints. The patient has minimal discomfort at her pacemaker site.  She is otherwise not having any chest pain/tightness/discomfort, shortness of breath, light-headed sensation, dizziness or palpitations.  She is S/P PPM placement yesterday for Widened LBBB and AL interval.  She is also being followed by renal for elevated creatinine post TAVR.    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          T(C): 36.8 (02-01-21 @ 08:07), Max: 37.1 (01-31-21 @ 23:13)  HR: 67 (02-01-21 @ 08:07) (62 - 80)  BP: 148/67 (02-01-21 @ 08:07) (131/60 - 170/73)  RR: 18 (02-01-21 @ 08:07) (18 - 19)  SpO2: 98% (02-01-21 @ 08:07) (96% - 99%)  Wt(kg): --  01-31 @ 07:01  -  02-01 @ 07:00  --------------------------------------------------------  IN: 900 mL / OUT: 1250 mL / NET: -350 mL    02-01 @ 07:01  -  02-01 @ 09:24  --------------------------------------------------------  IN: 240 mL / OUT: 0 mL / NET: 240 mL      MEDICATIONS  (STANDING):  amLODIPine   Tablet 2.5 milliGRAM(s) Oral daily  aspirin enteric coated 81 milliGRAM(s) Oral daily  atorvastatin 20 milliGRAM(s) Oral at bedtime  ceFAZolin   IVPB 2000 milliGRAM(s) IV Intermittent once  enoxaparin Injectable 30 milliGRAM(s) SubCutaneous daily  latanoprost 0.005% Ophthalmic Solution 1 Drop(s) Both EYES at bedtime  metoprolol succinate ER 50 milliGRAM(s) Oral daily    MEDICATIONS  (PRN):  acetaminophen   Tablet .. 650 milliGRAM(s) Oral every 6 hours PRN Temp greater or equal to 38.5C (101.3F), Mild Pain (1 - 3)      Review of Symptoms:  Constitutional: Awake, Alert, Follows commands  Respiratory: SOB/RIVAS on admission  Cardiac: Denies CP, Denies Palpitations  Gastrointestinal: Denies Pain, Denies N/V, tolerating po intake  Vascular: Negative  Extremities: + Edema, No joint pain or swelling  Neurological: Negative  Endocrine: No heat or cold intolerance, No excessive thirst  Heme/Onc: Negative    Exam:  General: A/Ox3, NAD  HEENT: Supple, No JVD, Trachea midline, no masses  Pulmonary: CTAB, = Chest Excursion, no accessory muscle use  Cor: S1S2, RRR, No murmur or rub  ECG: SR  Groin/Wound: Left pacemaker site is clean/dry/intact  Gastrointestinal: Soft, NT/ND, + Bowel Sounds  Neuro: = motor and sensory B/L, No focal deficits  Vascular: Trace edema  Extremities: No Joint pain or swelling  Skin: Warm/Dry/Normal color, Normal turgor, no rashes                          10.9   6.61  )-----------( 148      ( 01 Feb 2021 08:33 )             34.0   02-01    135  |  99  |  54<H>  ----------------------------<  120<H>  4.7   |  24  |  1.52<H>    Ca    8.7      01 Feb 2021 08:33  Phos  4.2     02-01  Mg     2.6     02-01    TPro  6.9  /  Alb  3.4  /  TBili  0.5  /  DBili  x   /  AST  20  /  ALT  18  /  AlkPhos  59  02-01      Imaging Reviewed:    Post Op TTE:  < from: TTE with Doppler (w/Cont) (01.28.21 @ 08:10) >  EF (Visual Estimate): 75-80 %  Doppler Peak Velocity (m/sec): AoV=2.4  ------------------------------------------------------------------------  Observations:  Mitral Valve: Normal mitral valve. Mild mitral  regurgitation.  Aortic Valve/Aorta: Transcatheter aortic valve replacement.  Peak transaortic valve gradient equals 25 mm Hg, mean  transaortic valve gradient equals 14 mm Hg, which is  probably normal in the presence of a transcatheter aortic  valve replacement. NEELIMA 2.4sqcm Minimal  paravalvular aortic  regurgitation. Peak left ventricular outflow tract gradient  equals 13 mm Hg, mean gradient is equal to 7 mm Hg, LVOT  velocity time integral equals 35 cm.  LVOT diameter: 2 cm.  Left Atrium: Severely dilated left atrium.  LA volume index  = 55 cc/m2.  Left Ventricle: Hyperdynamic left ventricular systolic  function. Moderate concentric left ventricular hypertrophy.  Reversal of the E-A waves of the mitral inflow pattern  consistent with reduced compliance of the left ventricle.  Right Heart: Normal right atrium. Normal right ventricular  size and function. Normal tricuspid valve. Minimal  tricuspid regurgitation. Normal pulmonic valve.  Pericardium/Pleura: Thickened pericardium.  Hemodynamic: Estimated right atrial pressure is 8 mm Hg.  Estimated right ventricular systolic pressure equals 28 mm  Hg, assuming right atrial pressure equals 8 mm Hg,  consistent with normal pulmonary pressures.    < end of copied text >        Assesment/Plan:  89 Female S/P TAVR, S/P PPM/ Acute on Chronic Kidney Injury, Chronic Diastolic Heart Failure  1.) S/P TAVR: ASA 81 mg po daily, Due to age will not give Plavix, Continue to Monitor Groins. Ambulate as tolerated. Post op TTE looks ok, Slightly hyperdynamic, but valve seated well and functioning normally.  2.) New LBBB: Patient had PPM placed yesterday due to prolonged AL Interval and LBBB. Will not need MCOT on discharge.  3.) Actue Kidney Injury: Continue to monitor creatinine. Renal input appreciated.  4.) Chronic Diastolic Heart Failure: Monitor Daily weight. Patient is back on metoprolol. May want to consider restarting daily maintenance furosemide 20 mg daily   5.) Discharge Plan: Patient is to follow up with Dr. Sanders in 1 week post discharge. She should then follow up with the [facility]ve Clinic  in 30 days, with a repeat Transthoracic Echo to be done at that visit.    All questions and concerns of the patient were addressed.    Findings and plan discussed with cardiac surgery and structural heart disease.

## 2021-02-01 NOTE — PROGRESS NOTE ADULT - ASSESSMENT
This is a 89 y/o female with h/o anemia, arrythmia, HTN, HLD, ASHD, carotid stenosis, MR and severe aortic stenosis with c/o progressively worsening dyspnea on exertion is scheduled for TAVR on 1/27/2021.       1/27 right transfemoral TAVR 29 mm core valve  NL EF, LBBB at baseline, ECG post op: patient doc into 30-40's, Rate increased to 60 and 20 output at that time now currently SR 50 - 60s, pacer settings changed to rate of 40 & output of 20. Pt now transferred to SDU, TVP to remain in place.   1/28   TVP,  started home dig and toprol 25   qd   now in afib  80   TTE done  1/29 Toproil increased to 50, and diovan started by Dr Sanders. TTE: minimal paravalvular AR, hyperdynamic LV, no pericardial effusion.  1/30 VSS, QRS remains prolonged compared to baseline, recommended for PPM Sunday per EP. BUN/Cr 56/1.81, Renal consulted - diovan & lasix dc'd, started on norvasc 2.5 qd, check Renal US, CXR, UA, and urine lytes.   1/31 VSS, s/p [employer] PPM (DDD 60 - 130). Post implant CXR no PTX. Transfuse 1U PRBC post PPM today per Dr. Sanders. BUN/Cr 61/1.8 unchanged.   2/1  Pts creat trending down.  CXR today and possible d/c home   This is a 89 y/o female with h/o anemia, arrythmia, HTN, HLD, ASHD, carotid stenosis, MR and severe aortic stenosis with c/o progressively worsening dyspnea on exertion is scheduled for TAVR on 1/27/2021.       1/27 right transfemoral TAVR 29 mm core valve  NL EF, LBBB at baseline, ECG post op: patient doc into 30-40's, Rate increased to 60 and 20 output at that time now currently SR 50 - 60s, pacer settings changed to rate of 40 & output of 20. Pt now transferred to SDU, TVP to remain in place.   1/28   TVP,  started home dig and toprol 25   qd   now in afib  80   TTE done  1/29 Toproil increased to 50, and diovan started by Dr Sanders. TTE: minimal paravalvular AR, hyperdynamic LV, no pericardial effusion.  1/30 VSS, QRS remains prolonged compared to baseline, recommended for PPM Sunday per EP. BUN/Cr 56/1.81, Renal consulted - diovan & lasix dc'd, started on norvasc 2.5 qd, check Renal US, CXR, UA, and urine lytes.   1/31 VSS, s/p [employer] PPM (DDD 60 - 130). Post implant CXR no PTX. Transfuse 1U PRBC post PPM today per Dr. Sanders. BUN/Cr 61/1.8 unchanged.   2/1  Pts creat trending down.  CXR today.  D/c planning delayed d/t weather concerns

## 2021-02-01 NOTE — PROGRESS NOTE ADULT - NSHPATTENDINGPLANDISCUSS_GEN_ALL_CORE
Dr srinivasan
cardiac surgery and structural heart team.
cardiac surgery team and structural heart team

## 2021-02-01 NOTE — PROGRESS NOTE ADULT - ASSESSMENT
The patient is a 89y Female with CAD, HTN, PVD, MR and severe aortic stenosis p/w worsening RIVAS. She is now s/p TAVR followed by acute on chronic kidney injury and PPM placement.  - CKD: likely stage 3 with baseline creatinine ~1.4 and minimal proteinuria.  - FELICITAS: nonoliguric. Likely prerenal due to diuresis, relative hypotension (in setting of ARB) +/-  AFib. Renal fxn stable improved   - hyponatremia: Likely hypovolemic, Improved  - HTN: BP improving.    RECOMMENDATIONS:  -   - continue to hold ARB  - a/w discharge planning for tomorrow if all stable     - please dose any new medications for a CrCl of ~ 20 cc/min  - avoid NSAIDs/nephrotoxins as able

## 2021-02-01 NOTE — PROGRESS NOTE ADULT - SUBJECTIVE AND OBJECTIVE BOX
i can go ?  VITAL SIGNS    Telemetry:  nsr 60    Vital Signs Last 24 Hrs  T(C): 36.8 (02-01-21 @ 08:07), Max: 37.1 (01-31-21 @ 23:13)  T(F): 98.3 (02-01-21 @ 08:07), Max: 98.7 (01-31-21 @ 23:13)  HR: 67 (02-01-21 @ 08:07) (62 - 80)  BP: 148/67 (02-01-21 @ 08:07) (131/60 - 170/73)  RR: 18 (02-01-21 @ 08:07) (18 - 19)  SpO2: 98% (02-01-21 @ 08:07) (96% - 99%)                   01-31 @ 07:01  -  02-01 @ 07:00  --------------------------------------------------------  IN: 900 mL / OUT: 1250 mL / NET: -350 mL    02-01 @ 07:01  -  02-01 @ 09:53  --------------------------------------------------------  IN: 240 mL / OUT: 0 mL / NET: 240 mL          Daily     Daily             CAPILLARY BLOOD GLUCOSE                      Coumadin    [ ] YES          [x  ]      NO                                   PHYSICAL EXAM        Neurology: alert and oriented x 3, nonfocal, no gross deficits  L PPM site with pressure dressing  CV : s1 s2 RRR  Lungs: cta  Abdomen: soft, nontender, nondistended, positive bowel sounds, last bowel movement +                        :    voiding        Extremities:      trace edema   /  -   calve tenderness ,              acetaminophen   Tablet .. 650 milliGRAM(s) Oral every 6 hours PRN  amLODIPine   Tablet 2.5 milliGRAM(s) Oral daily  aspirin enteric coated 81 milliGRAM(s) Oral daily  atorvastatin 20 milliGRAM(s) Oral at bedtime  ceFAZolin   IVPB 2000 milliGRAM(s) IV Intermittent once  latanoprost 0.005% Ophthalmic Solution 1 Drop(s) Both EYES at bedtime  metoprolol succinate ER 50 milliGRAM(s) Oral daily                    Physical Therapy Rec:   Home  [  ]   Home w/ PT  [  ]  Rehab  [  ]  Discussed with Cardiothoracic Team at AM rounds.   i can go ?  VITAL SIGNS    Telemetry:  nsr 60    Vital Signs Last 24 Hrs  T(C): 36.8 (02-01-21 @ 08:07), Max: 37.1 (01-31-21 @ 23:13)  T(F): 98.3 (02-01-21 @ 08:07), Max: 98.7 (01-31-21 @ 23:13)  HR: 67 (02-01-21 @ 08:07) (62 - 80)  BP: 148/67 (02-01-21 @ 08:07) (131/60 - 170/73)  RR: 18 (02-01-21 @ 08:07) (18 - 19)  SpO2: 98% (02-01-21 @ 08:07) (96% - 99%)                   01-31 @ 07:01  -  02-01 @ 07:00  --------------------------------------------------------  IN: 900 mL / OUT: 1250 mL / NET: -350 mL    02-01 @ 07:01  -  02-01 @ 09:53  --------------------------------------------------------  IN: 240 mL / OUT: 0 mL / NET: 240 mL          Daily     Daily             CAPILLARY BLOOD GLUCOSE                      Coumadin    [ ] YES          [x  ]      NO                                   PHYSICAL EXAM        Neurology: alert and oriented x 3, nonfocal, no gross deficits  L PPM site with pressure dressing  CV : s1 s2 RRR  Lungs: cta  Abdomen: soft, nontender, nondistended, positive bowel sounds, last bowel movement +                        :    voiding        Extremities:      trace edema   /  -   calve tenderness ,    bilat groins cdi          acetaminophen   Tablet .. 650 milliGRAM(s) Oral every 6 hours PRN  amLODIPine   Tablet 2.5 milliGRAM(s) Oral daily  aspirin enteric coated 81 milliGRAM(s) Oral daily  atorvastatin 20 milliGRAM(s) Oral at bedtime  ceFAZolin   IVPB 2000 milliGRAM(s) IV Intermittent once  latanoprost 0.005% Ophthalmic Solution 1 Drop(s) Both EYES at bedtime  metoprolol succinate ER 50 milliGRAM(s) Oral daily                    Physical Therapy Rec:   Home  [  ]   Home w/ PT  [  ]  Rehab  [  ]  Discussed with Cardiothoracic Team at AM rounds.

## 2021-02-02 ENCOUNTER — APPOINTMENT (OUTPATIENT)
Dept: CARDIOTHORACIC SURGERY | Facility: CLINIC | Age: 86
End: 2021-02-02

## 2021-02-02 VITALS
OXYGEN SATURATION: 95 % | RESPIRATION RATE: 18 BRPM | SYSTOLIC BLOOD PRESSURE: 131 MMHG | DIASTOLIC BLOOD PRESSURE: 58 MMHG | HEART RATE: 60 BPM | TEMPERATURE: 98 F

## 2021-02-02 LAB
ANION GAP SERPL CALC-SCNC: 11 MMOL/L — SIGNIFICANT CHANGE UP (ref 5–17)
BUN SERPL-MCNC: 55 MG/DL — HIGH (ref 7–23)
CALCIUM SERPL-MCNC: 8.8 MG/DL — SIGNIFICANT CHANGE UP (ref 8.4–10.5)
CHLORIDE SERPL-SCNC: 100 MMOL/L — SIGNIFICANT CHANGE UP (ref 96–108)
CO2 SERPL-SCNC: 22 MMOL/L — SIGNIFICANT CHANGE UP (ref 22–31)
CREAT SERPL-MCNC: 1.45 MG/DL — HIGH (ref 0.5–1.3)
GLUCOSE SERPL-MCNC: 125 MG/DL — HIGH (ref 70–99)
HCT VFR BLD CALC: 29.2 % — LOW (ref 34.5–45)
HGB BLD-MCNC: 9.4 G/DL — LOW (ref 11.5–15.5)
MCHC RBC-ENTMCNC: 28.7 PG — SIGNIFICANT CHANGE UP (ref 27–34)
MCHC RBC-ENTMCNC: 32.2 GM/DL — SIGNIFICANT CHANGE UP (ref 32–36)
MCV RBC AUTO: 89 FL — SIGNIFICANT CHANGE UP (ref 80–100)
NRBC # BLD: 0 /100 WBCS — SIGNIFICANT CHANGE UP (ref 0–0)
PLATELET # BLD AUTO: 148 K/UL — LOW (ref 150–400)
POTASSIUM SERPL-MCNC: 4.5 MMOL/L — SIGNIFICANT CHANGE UP (ref 3.5–5.3)
POTASSIUM SERPL-SCNC: 4.5 MMOL/L — SIGNIFICANT CHANGE UP (ref 3.5–5.3)
RBC # BLD: 3.28 M/UL — LOW (ref 3.8–5.2)
RBC # FLD: 12.5 % — SIGNIFICANT CHANGE UP (ref 10.3–14.5)
SODIUM SERPL-SCNC: 133 MMOL/L — LOW (ref 135–145)
WBC # BLD: 6.92 K/UL — SIGNIFICANT CHANGE UP (ref 3.8–10.5)
WBC # FLD AUTO: 6.92 K/UL — SIGNIFICANT CHANGE UP (ref 3.8–10.5)

## 2021-02-02 PROCEDURE — 93010 ELECTROCARDIOGRAM REPORT: CPT

## 2021-02-02 PROCEDURE — 99238 HOSP IP/OBS DSCHRG MGMT 30/<: CPT

## 2021-02-02 RX ORDER — FUROSEMIDE 40 MG
1 TABLET ORAL
Qty: 0 | Refills: 0 | DISCHARGE

## 2021-02-02 RX ORDER — AMLODIPINE BESYLATE 2.5 MG/1
1 TABLET ORAL
Qty: 30 | Refills: 0
Start: 2021-02-02 | End: 2021-03-03

## 2021-02-02 RX ORDER — ATORVASTATIN CALCIUM 80 MG/1
1 TABLET, FILM COATED ORAL
Qty: 0 | Refills: 0 | DISCHARGE
Start: 2021-02-02

## 2021-02-02 RX ORDER — MONTELUKAST 4 MG/1
1 TABLET, CHEWABLE ORAL
Qty: 30 | Refills: 0
Start: 2021-02-02 | End: 2021-03-03

## 2021-02-02 RX ORDER — ACETAMINOPHEN 500 MG
3 TABLET ORAL
Qty: 45 | Refills: 0
Start: 2021-02-02 | End: 2021-02-06

## 2021-02-02 RX ORDER — METOPROLOL TARTRATE 50 MG
1 TABLET ORAL
Qty: 0 | Refills: 0 | DISCHARGE

## 2021-02-02 RX ORDER — METOPROLOL TARTRATE 50 MG
1 TABLET ORAL
Qty: 30 | Refills: 0
Start: 2021-02-02 | End: 2021-03-03

## 2021-02-02 RX ORDER — ASPIRIN/CALCIUM CARB/MAGNESIUM 324 MG
1 TABLET ORAL
Qty: 0 | Refills: 0 | DISCHARGE
Start: 2021-02-02

## 2021-02-02 RX ADMIN — Medication 650 MILLIGRAM(S): at 05:41

## 2021-02-02 RX ADMIN — Medication 50 MILLIGRAM(S): at 05:41

## 2021-02-02 RX ADMIN — Medication 650 MILLIGRAM(S): at 12:40

## 2021-02-02 RX ADMIN — Medication 81 MILLIGRAM(S): at 12:40

## 2021-02-02 RX ADMIN — AMLODIPINE BESYLATE 2.5 MILLIGRAM(S): 2.5 TABLET ORAL at 05:41

## 2021-02-03 RX ORDER — FUROSEMIDE 40 MG/1
40 TABLET ORAL DAILY
Qty: 14 | Refills: 0 | Status: DISCONTINUED | COMMUNITY
End: 2021-02-03

## 2021-02-03 RX ORDER — VALSARTAN AND HYDROCHLOROTHIAZIDE 80; 12.5 MG/1; MG/1
80-12.5 TABLET, FILM COATED ORAL DAILY
Refills: 0 | Status: DISCONTINUED | COMMUNITY
End: 2021-02-03

## 2021-02-03 RX ORDER — FLUTICASONE PROPIONATE AND SALMETEROL 100; 50 UG/1; UG/1
100-50 POWDER RESPIRATORY (INHALATION)
Refills: 0 | Status: DISCONTINUED | COMMUNITY
End: 2021-02-03

## 2021-02-03 RX ORDER — DIGOXIN 125 UG/1
125 TABLET ORAL
Qty: 90 | Refills: 1 | Status: DISCONTINUED | COMMUNITY
End: 2021-02-03

## 2021-02-03 RX ORDER — ALBUTEROL SULFATE 2.5 MG/3ML
(2.5 MG/3ML) SOLUTION RESPIRATORY (INHALATION)
Refills: 0 | Status: DISCONTINUED | COMMUNITY
End: 2021-02-03

## 2021-02-04 ENCOUNTER — APPOINTMENT (OUTPATIENT)
Dept: CARE COORDINATION | Facility: HOME HEALTH | Age: 86
End: 2021-02-04

## 2021-02-04 VITALS
OXYGEN SATURATION: 95 % | TEMPERATURE: 96.9 F | BODY MASS INDEX: 35.88 KG/M2 | DIASTOLIC BLOOD PRESSURE: 50 MMHG | SYSTOLIC BLOOD PRESSURE: 114 MMHG | RESPIRATION RATE: 20 BRPM | HEART RATE: 60 BPM | WEIGHT: 195 LBS

## 2021-02-04 NOTE — REVIEW OF SYSTEMS
[Red Eyes] : eyes not red [Discharge From Eyes] : no purulent discharge from the eyes [Lower Ext Edema] : lower extremity edema [Cough] : cough [Negative] : Heme/Lymph [FreeTextEntry5] : Chronic BLE edema, left greater than right.  [FreeTextEntry4] : Denies loss of sensation of taste or smell. [FreeTextEntry7] : Last BM today [de-identified] : PPM site & right groin site.

## 2021-02-04 NOTE — HISTORY OF PRESENT ILLNESS
[FreeTextEntry1] : As per EMR:\par \par Hospital Course: Pt. is a 89-year-old female with PMHx of anemia, arrhythmia, HTN, HLD, ASHD, carotid stenosis, MR and severe aortic stenosis with c/o progressively worsening dyspnea on exertion.\par 1/27/21 pt. underwent  right transfemoral TAVR 29 mm core valve. Post-op course complicated by bradycardia & progressive widening of LBBB resulting in PPM implantation on 1/31/21 ([employer] PPM (DDD 60 - 130) & anemia requiring transfusion of 1 u PRBCs. She was discharged home on 2/2/21.\par \par She is seen & examined in home she lives in with her daughter for routine f/u. \par \par Pt. reports she is feeling "much better" than pre-op & her SOB has improved. All medications in home & being taken correctly. \par \par

## 2021-02-04 NOTE — ASSESSMENT
[FreeTextEntry1] : Overweight, elderly female doing well s/p TAVR & PPM implantation.\par \par Problems:\par 1. AS, s/p TAVR, CAD, HTN, HLD\par c/w\par Daily weights and showers.\par Continue with current meds: ASA, Atorvastatin, Metoprolol & Amlodipine.\par Keep legs elevated.\par Homecare- [facility].\par Diet- low salt, low fat.\par f/u appts - CTS, Cardiology, EP, & PCP.\par [facility] team will continue to f/u with pt status. \par Pt agrees to call with any questions, issues or concerns.\par \par \par 2. HB, s/p PPM\par Monitor site for infection\par f/u with cardiology & EP\par Pacer checks as per cardiology\par \par \par 3.  Dry cough likely related to post nasal drip\par Resume Singulair (she had not been taking)\par Consider Claritin 5 mg po qhs\par \par 4. Glaucoma\par c/w Travoprost as ordered\par \par

## 2021-02-04 NOTE — PHYSICAL EXAM
[Sclera] : the sclera and conjunctiva were normal [PERRL With Normal Accommodation] : pupils were equal in size, round, and reactive to light [Neck Appearance] : the appearance of the neck was normal [Jugular Venous Distention Increased] : there was no jugular-venous distention [] : no respiratory distress [Respiration, Rhythm And Depth] : normal respiratory rhythm and effort [Exaggerated Use Of Accessory Muscles For Inspiration] : no accessory muscle use [Auscultation Breath Sounds / Voice Sounds] : lungs were clear to auscultation bilaterally [Heart Rate And Rhythm] : heart rate was normal and rhythm regular [Heart Sounds] : normal S1 and S2 [Examination Of The Chest] : the chest was normal in appearance [Chest Visual Inspection Thoracic Asymmetry] : no chest asymmetry [2+] : left 2+ [Fingers] :  capillary refill of the fingers was normal [Toes] :  capillary refill of the toes was normal [FreeTextEntry1] : Right groin access site healing. No bleeding, ecchymosis or hematoma.  [Bowel Sounds] : normal bowel sounds [Abdomen Soft] : soft [Abdomen Tenderness] : non-tender [Skin Color & Pigmentation] : normal skin color and pigmentation [Skin Turgor] : normal skin turgor [No Focal Deficits] : no focal deficits [Oriented To Time, Place, And Person] : oriented to person, place, and time [Impaired Insight] : insight and judgment were intact [Affect] : the affect was normal [Mood] : the mood was normal

## 2021-02-05 ENCOUNTER — TRANSCRIPTION ENCOUNTER (OUTPATIENT)
Age: 86
End: 2021-02-05

## 2021-02-06 ENCOUNTER — NON-APPOINTMENT (OUTPATIENT)
Age: 86
End: 2021-02-06

## 2021-02-07 ENCOUNTER — NON-APPOINTMENT (OUTPATIENT)
Age: 86
End: 2021-02-07

## 2021-02-09 ENCOUNTER — APPOINTMENT (OUTPATIENT)
Dept: CARDIOTHORACIC SURGERY | Facility: CLINIC | Age: 86
End: 2021-02-09
Payer: MEDICARE

## 2021-02-09 VITALS
OXYGEN SATURATION: 96 % | SYSTOLIC BLOOD PRESSURE: 169 MMHG | HEART RATE: 80 BPM | WEIGHT: 195 LBS | HEIGHT: 62 IN | DIASTOLIC BLOOD PRESSURE: 71 MMHG | TEMPERATURE: 98 F | RESPIRATION RATE: 14 BRPM | BODY MASS INDEX: 35.88 KG/M2

## 2021-02-09 LAB
ALBUMIN SERPL ELPH-MCNC: 4.3 G/DL
ALP BLD-CCNC: 64 U/L
ALT SERPL-CCNC: 18 U/L
ANION GAP SERPL CALC-SCNC: 14 MMOL/L
ANION GAP SERPL CALC-SCNC: 9 MMOL/L
AST SERPL-CCNC: 18 U/L
BASOPHILS # BLD AUTO: 0.03 K/UL
BASOPHILS NFR BLD AUTO: 0.5 %
BILIRUB SERPL-MCNC: 0.4 MG/DL
BUN SERPL-MCNC: 39 MG/DL
BUN SERPL-MCNC: 56 MG/DL
CALCIUM SERPL-MCNC: 9.4 MG/DL
CALCIUM SERPL-MCNC: 9.9 MG/DL
CHLORIDE SERPL-SCNC: 98 MMOL/L
CHLORIDE SERPL-SCNC: 99 MMOL/L
CO2 SERPL-SCNC: 27 MMOL/L
CO2 SERPL-SCNC: 29 MMOL/L
CREAT SERPL-MCNC: 1.17 MG/DL
CREAT SERPL-MCNC: 1.61 MG/DL
EOSINOPHIL # BLD AUTO: 0.18 K/UL
EOSINOPHIL NFR BLD AUTO: 3 %
GLUCOSE SERPL-MCNC: 122 MG/DL
GLUCOSE SERPL-MCNC: 128 MG/DL
HCT VFR BLD CALC: 31.1 %
HGB BLD-MCNC: 9.8 G/DL
IMM GRANULOCYTES NFR BLD AUTO: 0.7 %
LYMPHOCYTES # BLD AUTO: 1.8 K/UL
LYMPHOCYTES NFR BLD AUTO: 29.7 %
MAN DIFF?: NORMAL
MCHC RBC-ENTMCNC: 28.9 PG
MCHC RBC-ENTMCNC: 31.5 GM/DL
MCV RBC AUTO: 91.7 FL
MONOCYTES # BLD AUTO: 0.82 K/UL
MONOCYTES NFR BLD AUTO: 13.5 %
NEUTROPHILS # BLD AUTO: 3.19 K/UL
NEUTROPHILS NFR BLD AUTO: 52.6 %
NT-PROBNP SERPL-MCNC: 2027 PG/ML
PLATELET # BLD AUTO: 192 K/UL
POTASSIUM SERPL-SCNC: 5.3 MMOL/L
POTASSIUM SERPL-SCNC: 5.5 MMOL/L
PROT SERPL-MCNC: 7.3 G/DL
RBC # BLD: 3.39 M/UL
RBC # FLD: 12 %
SODIUM SERPL-SCNC: 137 MMOL/L
SODIUM SERPL-SCNC: 139 MMOL/L
WBC # FLD AUTO: 6.06 K/UL

## 2021-02-09 PROCEDURE — 99214 OFFICE O/P EST MOD 30 MIN: CPT

## 2021-02-09 PROCEDURE — 99072 ADDL SUPL MATRL&STAF TM PHE: CPT

## 2021-02-11 ENCOUNTER — NON-APPOINTMENT (OUTPATIENT)
Age: 86
End: 2021-02-11

## 2021-02-11 ENCOUNTER — APPOINTMENT (OUTPATIENT)
Dept: ELECTROPHYSIOLOGY | Facility: CLINIC | Age: 86
End: 2021-02-11
Payer: MEDICARE

## 2021-02-11 VITALS
DIASTOLIC BLOOD PRESSURE: 89 MMHG | HEART RATE: 75 BPM | SYSTOLIC BLOOD PRESSURE: 146 MMHG | WEIGHT: 195 LBS | OXYGEN SATURATION: 92 % | BODY MASS INDEX: 35.88 KG/M2 | HEIGHT: 62 IN

## 2021-02-11 PROCEDURE — 99024 POSTOP FOLLOW-UP VISIT: CPT

## 2021-02-11 PROCEDURE — 93280 PM DEVICE PROGR EVAL DUAL: CPT

## 2021-02-16 ENCOUNTER — NON-APPOINTMENT (OUTPATIENT)
Age: 86
End: 2021-02-16

## 2021-02-20 ENCOUNTER — INPATIENT (INPATIENT)
Facility: HOSPITAL | Age: 86
LOS: 1 days | Discharge: HOME | DRG: 291 | End: 2021-02-22
Attending: INTERNAL MEDICINE | Admitting: INTERNAL MEDICINE
Payer: MEDICARE

## 2021-02-20 ENCOUNTER — TRANSCRIPTION ENCOUNTER (OUTPATIENT)
Age: 86
End: 2021-02-20

## 2021-02-20 VITALS
OXYGEN SATURATION: 100 % | HEART RATE: 85 BPM | DIASTOLIC BLOOD PRESSURE: 70 MMHG | RESPIRATION RATE: 22 BRPM | SYSTOLIC BLOOD PRESSURE: 141 MMHG | TEMPERATURE: 98 F | HEIGHT: 64 IN | WEIGHT: 195.11 LBS

## 2021-02-20 DIAGNOSIS — Z90.49 ACQUIRED ABSENCE OF OTHER SPECIFIED PARTS OF DIGESTIVE TRACT: Chronic | ICD-10-CM

## 2021-02-20 DIAGNOSIS — Z95.0 PRESENCE OF CARDIAC PACEMAKER: ICD-10-CM

## 2021-02-20 DIAGNOSIS — Z95.2 PRESENCE OF PROSTHETIC HEART VALVE: ICD-10-CM

## 2021-02-20 DIAGNOSIS — I25.10 ATHEROSCLEROTIC HEART DISEASE OF NATIVE CORONARY ARTERY WITHOUT ANGINA PECTORIS: ICD-10-CM

## 2021-02-20 DIAGNOSIS — I34.0 NONRHEUMATIC MITRAL (VALVE) INSUFFICIENCY: ICD-10-CM

## 2021-02-20 DIAGNOSIS — Z96.60 PRESENCE OF UNSPECIFIED ORTHOPEDIC JOINT IMPLANT: Chronic | ICD-10-CM

## 2021-02-20 DIAGNOSIS — I50.9 HEART FAILURE, UNSPECIFIED: ICD-10-CM

## 2021-02-20 LAB
ALBUMIN SERPL ELPH-MCNC: 4 G/DL — SIGNIFICANT CHANGE UP (ref 3.3–5)
ALP SERPL-CCNC: 62 U/L — SIGNIFICANT CHANGE UP (ref 40–120)
ALT FLD-CCNC: 19 U/L — SIGNIFICANT CHANGE UP (ref 10–45)
ANION GAP SERPL CALC-SCNC: 11 MMOL/L — SIGNIFICANT CHANGE UP (ref 5–17)
APTT BLD: 34.3 SEC — SIGNIFICANT CHANGE UP (ref 27.5–35.5)
AST SERPL-CCNC: 26 U/L — SIGNIFICANT CHANGE UP (ref 10–40)
BASE EXCESS BLDV CALC-SCNC: 3.2 MMOL/L — HIGH (ref -2–2)
BASOPHILS # BLD AUTO: 0.04 K/UL — SIGNIFICANT CHANGE UP (ref 0–0.2)
BASOPHILS NFR BLD AUTO: 0.6 % — SIGNIFICANT CHANGE UP (ref 0–2)
BILIRUB SERPL-MCNC: 0.4 MG/DL — SIGNIFICANT CHANGE UP (ref 0.2–1.2)
BUN SERPL-MCNC: 42 MG/DL — HIGH (ref 7–23)
CA-I SERPL-SCNC: 1.21 MMOL/L — SIGNIFICANT CHANGE UP (ref 1.12–1.3)
CALCIUM SERPL-MCNC: 9.3 MG/DL — SIGNIFICANT CHANGE UP (ref 8.4–10.5)
CHLORIDE BLDV-SCNC: 103 MMOL/L — SIGNIFICANT CHANGE UP (ref 96–108)
CHLORIDE SERPL-SCNC: 98 MMOL/L — SIGNIFICANT CHANGE UP (ref 96–108)
CO2 BLDV-SCNC: 30 MMOL/L — SIGNIFICANT CHANGE UP (ref 22–30)
CO2 SERPL-SCNC: 27 MMOL/L — SIGNIFICANT CHANGE UP (ref 22–31)
CREAT SERPL-MCNC: 1.17 MG/DL — SIGNIFICANT CHANGE UP (ref 0.5–1.3)
EOSINOPHIL # BLD AUTO: 0.16 K/UL — SIGNIFICANT CHANGE UP (ref 0–0.5)
EOSINOPHIL NFR BLD AUTO: 2.4 % — SIGNIFICANT CHANGE UP (ref 0–6)
GAS PNL BLDV: 133 MMOL/L — LOW (ref 135–145)
GAS PNL BLDV: SIGNIFICANT CHANGE UP
GAS PNL BLDV: SIGNIFICANT CHANGE UP
GLUCOSE BLDV-MCNC: 129 MG/DL — HIGH (ref 70–99)
GLUCOSE SERPL-MCNC: 167 MG/DL — HIGH (ref 70–99)
HCO3 BLDV-SCNC: 28 MMOL/L — SIGNIFICANT CHANGE UP (ref 21–29)
HCT VFR BLD CALC: 31 % — LOW (ref 34.5–45)
HCT VFR BLDA CALC: 30 % — LOW (ref 39–50)
HGB BLD CALC-MCNC: 9.5 G/DL — LOW (ref 11.5–15.5)
HGB BLD-MCNC: 9.6 G/DL — LOW (ref 11.5–15.5)
IMM GRANULOCYTES NFR BLD AUTO: 0.4 % — SIGNIFICANT CHANGE UP (ref 0–1.5)
INR BLD: 1.83 RATIO — HIGH (ref 0.88–1.16)
LACTATE BLDV-MCNC: 1.6 MMOL/L — SIGNIFICANT CHANGE UP (ref 0.7–2)
LYMPHOCYTES # BLD AUTO: 0.94 K/UL — LOW (ref 1–3.3)
LYMPHOCYTES # BLD AUTO: 14 % — SIGNIFICANT CHANGE UP (ref 13–44)
MAGNESIUM SERPL-MCNC: 2.2 MG/DL — SIGNIFICANT CHANGE UP (ref 1.6–2.6)
MCHC RBC-ENTMCNC: 28.5 PG — SIGNIFICANT CHANGE UP (ref 27–34)
MCHC RBC-ENTMCNC: 31 GM/DL — LOW (ref 32–36)
MCV RBC AUTO: 92 FL — SIGNIFICANT CHANGE UP (ref 80–100)
MONOCYTES # BLD AUTO: 0.7 K/UL — SIGNIFICANT CHANGE UP (ref 0–0.9)
MONOCYTES NFR BLD AUTO: 10.4 % — SIGNIFICANT CHANGE UP (ref 2–14)
NEUTROPHILS # BLD AUTO: 4.84 K/UL — SIGNIFICANT CHANGE UP (ref 1.8–7.4)
NEUTROPHILS NFR BLD AUTO: 72.2 % — SIGNIFICANT CHANGE UP (ref 43–77)
NRBC # BLD: 0 /100 WBCS — SIGNIFICANT CHANGE UP (ref 0–0)
NT-PROBNP SERPL-SCNC: 5598 PG/ML — HIGH (ref 0–300)
OTHER CELLS CSF MANUAL: 8 ML/DL — LOW (ref 18–22)
PCO2 BLDV: 51 MMHG — HIGH (ref 35–50)
PH BLDV: 7.37 — SIGNIFICANT CHANGE UP (ref 7.35–7.45)
PLATELET # BLD AUTO: 177 K/UL — SIGNIFICANT CHANGE UP (ref 150–400)
PO2 BLDV: 36 MMHG — SIGNIFICANT CHANGE UP (ref 25–45)
POTASSIUM BLDV-SCNC: 5 MMOL/L — SIGNIFICANT CHANGE UP (ref 3.5–5.3)
POTASSIUM SERPL-MCNC: 5.3 MMOL/L — SIGNIFICANT CHANGE UP (ref 3.5–5.3)
POTASSIUM SERPL-SCNC: 5.3 MMOL/L — SIGNIFICANT CHANGE UP (ref 3.5–5.3)
PROT SERPL-MCNC: 7.2 G/DL — SIGNIFICANT CHANGE UP (ref 6–8.3)
PROTHROM AB SERPL-ACNC: 21.3 SEC — HIGH (ref 10.6–13.6)
RBC # BLD: 3.37 M/UL — LOW (ref 3.8–5.2)
RBC # FLD: 13 % — SIGNIFICANT CHANGE UP (ref 10.3–14.5)
SAO2 % BLDV: 62 % — LOW (ref 67–88)
SARS-COV-2 RNA SPEC QL NAA+PROBE: SIGNIFICANT CHANGE UP
SODIUM SERPL-SCNC: 136 MMOL/L — SIGNIFICANT CHANGE UP (ref 135–145)
TROPONIN T, HIGH SENSITIVITY RESULT: 45 NG/L — SIGNIFICANT CHANGE UP (ref 0–51)
TROPONIN T, HIGH SENSITIVITY RESULT: 51 NG/L — SIGNIFICANT CHANGE UP (ref 0–51)
WBC # BLD: 6.71 K/UL — SIGNIFICANT CHANGE UP (ref 3.8–10.5)
WBC # FLD AUTO: 6.71 K/UL — SIGNIFICANT CHANGE UP (ref 3.8–10.5)

## 2021-02-20 PROCEDURE — 99233 SBSQ HOSP IP/OBS HIGH 50: CPT

## 2021-02-20 PROCEDURE — 99285 EMERGENCY DEPT VISIT HI MDM: CPT

## 2021-02-20 PROCEDURE — 71045 X-RAY EXAM CHEST 1 VIEW: CPT | Mod: 26

## 2021-02-20 PROCEDURE — 99222 1ST HOSP IP/OBS MODERATE 55: CPT

## 2021-02-20 PROCEDURE — 93306 TTE W/DOPPLER COMPLETE: CPT | Mod: 26

## 2021-02-20 RX ORDER — FUROSEMIDE 40 MG
0 TABLET ORAL
Qty: 0 | Refills: 0 | DISCHARGE

## 2021-02-20 RX ORDER — FLUTICASONE PROPIONATE AND SALMETEROL 50; 250 UG/1; UG/1
1 POWDER ORAL; RESPIRATORY (INHALATION)
Qty: 0 | Refills: 0 | DISCHARGE

## 2021-02-20 RX ORDER — AMLODIPINE BESYLATE 2.5 MG/1
2.5 TABLET ORAL DAILY
Refills: 0 | Status: DISCONTINUED | OUTPATIENT
Start: 2021-02-20 | End: 2021-02-22

## 2021-02-20 RX ORDER — FUROSEMIDE 40 MG
20 TABLET ORAL ONCE
Refills: 0 | Status: COMPLETED | OUTPATIENT
Start: 2021-02-20 | End: 2021-02-20

## 2021-02-20 RX ORDER — LATANOPROST 0.05 MG/ML
1 SOLUTION/ DROPS OPHTHALMIC; TOPICAL AT BEDTIME
Refills: 0 | Status: DISCONTINUED | OUTPATIENT
Start: 2021-02-20 | End: 2021-02-22

## 2021-02-20 RX ORDER — ATORVASTATIN CALCIUM 80 MG/1
20 TABLET, FILM COATED ORAL AT BEDTIME
Refills: 0 | Status: DISCONTINUED | OUTPATIENT
Start: 2021-02-20 | End: 2021-02-22

## 2021-02-20 RX ORDER — ASPIRIN/CALCIUM CARB/MAGNESIUM 324 MG
81 TABLET ORAL DAILY
Refills: 0 | Status: DISCONTINUED | OUTPATIENT
Start: 2021-02-20 | End: 2021-02-22

## 2021-02-20 RX ORDER — POTASSIUM CHLORIDE 20 MEQ
40 PACKET (EA) ORAL EVERY 4 HOURS
Refills: 0 | Status: COMPLETED | OUTPATIENT
Start: 2021-02-20 | End: 2021-02-21

## 2021-02-20 RX ORDER — FUROSEMIDE 40 MG
40 TABLET ORAL ONCE
Refills: 0 | Status: DISCONTINUED | OUTPATIENT
Start: 2021-02-20 | End: 2021-02-20

## 2021-02-20 RX ORDER — APIXABAN 2.5 MG/1
0 TABLET, FILM COATED ORAL
Qty: 0 | Refills: 0 | DISCHARGE

## 2021-02-20 RX ORDER — APIXABAN 2.5 MG/1
5 TABLET, FILM COATED ORAL EVERY 12 HOURS
Refills: 0 | Status: DISCONTINUED | OUTPATIENT
Start: 2021-02-20 | End: 2021-02-22

## 2021-02-20 RX ORDER — FUROSEMIDE 40 MG
40 TABLET ORAL
Refills: 0 | Status: DISCONTINUED | OUTPATIENT
Start: 2021-02-20 | End: 2021-02-22

## 2021-02-20 RX ORDER — MONTELUKAST 4 MG/1
10 TABLET, CHEWABLE ORAL DAILY
Refills: 0 | Status: DISCONTINUED | OUTPATIENT
Start: 2021-02-20 | End: 2021-02-22

## 2021-02-20 RX ORDER — METOPROLOL TARTRATE 50 MG
50 TABLET ORAL DAILY
Refills: 0 | Status: DISCONTINUED | OUTPATIENT
Start: 2021-02-20 | End: 2021-02-22

## 2021-02-20 RX ORDER — TRAVOPROST 0.04 MG/ML
1 SOLUTION/ DROPS OPHTHALMIC
Qty: 0 | Refills: 0 | DISCHARGE

## 2021-02-20 RX ADMIN — ATORVASTATIN CALCIUM 20 MILLIGRAM(S): 80 TABLET, FILM COATED ORAL at 21:55

## 2021-02-20 RX ADMIN — Medication 20 MILLIGRAM(S): at 11:41

## 2021-02-20 RX ADMIN — Medication 40 MILLIEQUIVALENT(S): at 21:55

## 2021-02-20 NOTE — CONSULT NOTE ADULT - ASSESSMENT
89yo woman severe AS s/p TAVR 1/2020 with #29 CLAUDIA Evolut Pro Plus bioprosthesis, HTN, T2DM, carotid disease and CAD s/p PCI to LAD in 3/2015. She has chronic LE edema L>R usually. NEELIMA was 0.9, mGr 57, pGr 91, AoV 4.3 and moderate MR. Had baseline LBBB. CLAUDIA was performed and because of 1st AVB/LBBB, PPM was implanted preemptively for impending AVB. Presenting now for RIVAS.    -Decompensated HFpEF (moderate LV concentric hypertrophy).       89yo woman severe AS s/p TAVR 1/2020 with #29 CLAUDIA Evolut Pro Plus bioprosthesis, HTN, T2DM, carotid disease and CAD s/p PCI to LAD in 3/2015. She has chronic LE edema L>R usually. NEELIMA was 0.9, mGr 57, pGr 91, AoV 4.3 and moderate MR. Had baseline LBBB. CLAUDIA was performed and because of 1st AVB/LBBB, PPM was implanted preemptively for impending AVB. Presenting now for RIVAS.    #Decompensated HFpEF (moderate LV concentric hypertrophy)  -Likely 2/2 to inadequate diuresis and missed doses of Lasix.  -Presenting with VOL, bibasilar crackles; diuresis with Lasix 40mg IV BID for now.  -No   -Strict ins and outs and daily weights.  -Replete lytes PRN, Mg=2, Phos=3, and K=4.  -2g Na restricted diet.  -Continue telemetry monitoring.    #ASCVD risk  -Can continue ASA 81mg PO daily  -Lipitor 20mg PO daily.    Digoxin 125 daily  Lasix 40mg PO daily  Toprol XL 50mg daily  Norvasc 2.5mg PO daily           89yo woman severe AS s/p TAVR 1/2020 with #29 CLAUDIA Evolut Pro Plus bioprosthesis, HTN, T2DM, carotid disease and CAD s/p PCI to LAD in 3/2015. She has chronic LE edema L>R usually. NEELIMA was 0.9, mGr 57, pGr 91, AoV 4.3 and moderate MR. Had baseline LBBB. CLAUDIA was performed and because of 1st AVB/LBBB, PPM was implanted preemptively for impending AVB. Presenting now for RIVAS.    #Decompensated HFpEF (moderate LV concentric hypertrophy)  -Likely 2/2 to inadequate diuresis and missed doses of Lasix.  -Presenting with VOL, bibasilar crackles; diuresis with Lasix 40mg IV BID for now.  -Continue GDMT: Toprol XL 50mg PO daily.  -She is in NSR, would D/c digoxin.   -Strict ins and outs and daily weights.  -Replete lytes PRN, Mg=2, Phos=3, and K=4.  -2g Na restricted diet.  -Continue telemetry monitoring.    #HTN  -Can continue Norvasc 2.5mg PO daily.    #ASCVD risk  -Can continue ASA 81mg PO daily  -Lipitor 20mg PO daily.    Case discussed with Dr. Ford.    Katerina Vital MD PGY-5  Cardiology Fellow  All Cardiology service information can be found 24/7 on [email or web address], password: [email or web address]  Note is preliminary until signed by the attending.

## 2021-02-20 NOTE — CONSULT NOTE ADULT - ASSESSMENT
This is a 89y/o female with Hx of baseline LBBB, pAF, severe AS s/p TAVR 1/27/21 c/b prolonged QRS s/p PPM implant, HTN, T2DM, carotid disease and CAD s/p PCI to LAD in 3/2015. She has chronic LE edema L>R usually, was on Lasix 40 PO daily at home. Pt now presenting with c/o RIVAS. CT Surgery consulted for further evaluation. TTE performed at bedside, mod MR, LVOT TVI 20.4 cm. LVOTd 1.92 cm, Aortic TVI 32.9 cm, mean gradient 6 mmHg, Aortic valve area by continuity 1.8 cm2. Mild paravalvular aortic regurgitation. Normal pericardium with small pericardial effusion.

## 2021-02-20 NOTE — CONSULT NOTE ADULT - PROBLEM SELECTOR RECOMMENDATION 2
Cardiology following  Agree with diuresis on IV lasix BID for now - can transition to PO for discharge   Strict I & Os  daily weight

## 2021-02-20 NOTE — ED PROVIDER NOTE - NS ED ROS FT
General: Denies dizziness, fatigue  Eyes: Denies blurry vision  ENMT: Denies rhinorrhea  Respiratory: +SOB - Denies cough  Cardiovascular: Denies palpitations, CP  Gastrointestinal: Denies abd pain, N/V/D/C, hematochezia, melena  : Denies dysuria, increased freq  Musculoskeletal: Denies edema, joint pain  Endocrine: Denies increased thirst, increased frequency  Allergic/Immunologic: Denies rashes or hives  Neuro: Denies weakness, numbness

## 2021-02-20 NOTE — ED PROVIDER NOTE - PHYSICAL EXAMINATION
Constitutional: NAD  Eyes: PERRL, sclera clear  ENMT: MMM, clear oropharynx  Neck: Supple, no cervical LAD  Respiratory: b/l crackles - no rales, rhonchi or wheezes  Cardiovascular: RRR, no m/g/r  Gastrointestinal: +BS, soft, NT/ND  Neurological: AAOx3  Psychiatric: Appropriate affect and mood  Extremities: +1 pitting edema BLE

## 2021-02-20 NOTE — CONSULT NOTE ADULT - PROBLEM SELECTOR RECOMMENDATION 9
TTE 2/20 reviewed by Dr. Ulrich, TAV well seated, mild paravalvular leak and small pericardial effusion.   Continue medical management as per primary team- no CT Surgery intervention indicated at this time.   Pt has appointment with Dr. Ulrich on 3/11 at 10:00am for 30 day post-TAVR follow up.

## 2021-02-20 NOTE — CONSULT NOTE ADULT - SUBJECTIVE AND OBJECTIVE BOX
In-House Cardiology Consult Note  ---------------------------------------------    HPI:  89yo woman severe AS s/p TAVR 1/2020 with #29 CLAUDIA Evolut Pro Plus bioprosthesis, HTN, T2DM, carotid disease and CAD s/p PCI to LAD in 3/2015. She has chronic LE edema L>R usually. NEELIMA was 0.9, mGr 57, pGr 91, AoV 4.3 and moderate MR. Had baseline LBBB.    CLAUDIA was performed and because of 1st AVB/LBBB, PPM was implanted preemptively for impending AVB.     She presents today with 3-4 day of RIVAS. Her baseline ET is limited, she ambulates with walker assistance. She noticed that walking around her house had become tougher than usual for her. Her daughter notes that she may have missed a few doses of her home diuretic (Lasix 40mg PO daily)- taken to maintain euvolemia for HFpEF (moderate LV concentric hypertrophy).    Telemetry: NSR with PVCs    PMHx:   Varicose veins of lower extremity  Glaucoma  HLD (hyperlipidemia)  Carotid stenosis  Hypertension  Mitral regurgitation  Aortic stenosis    PSHx:   History of appendectomy  H/O bilateral hip replacements    Allergies:  No Known Allergies    Home Meds:  ASA 81mg  Lipitor 20mg  Digoxin 125 daily  Lasix 40mg PO daily  Toprol XL 50mg daily  Norvasc 2.5mg PO daily    FAMILY HISTORY: Noncontributory    Social History:  Smoking History: N/A  Alcohol Use: N/A  Drug Use: N/A    REVIEW OF SYSTEMS:  CONSTITUTIONAL: No weakness, fevers or chills  EYES/ENT: No visual changes;  No dysphagia  NECK: No pain or stiffness  RESPIRATORY: No cough, wheezing, hemoptysis; No shortness of breath  CARDIOVASCULAR: No chest pain or palpitations; No lower extremity edema  GASTROINTESTINAL: No abdominal or epigastric pain. No nausea, vomiting, or hematemesis; No diarrhea or constipation. No melena or hematochezia.  BACK: No back pain  GENITOURINARY: No dysuria, frequency or hematuria  NEUROLOGICAL: No numbness or weakness  SKIN: No itching, burning, rashes, or lesions   All other review of systems is negative unless indicated above.    Physical Exam:  T(F): 97.7 (02-20), Max: 97.9 (02-20)  HR: 92 (02-20) (85 - 92)  BP: 141/59 (02-20) (141/59 - 141/70)  RR: 21 (02-20)  SpO2: 100% (02-20)  GENERAL: No acute distress, well-developed  HEAD:  Atraumatic, Normocephalic  ENT: EOMI, PERRLA, conjunctiva and sclera clear, Neck supple, No JVD, moist mucosa  CHEST/LUNG: Moderate crackles in LE b/l  HEART: Regular rate and rhythm; No murmurs, rubs, or gallops  ABDOMEN: Soft, Nontender, Nondistended; Bowel sounds present  EXTREMITIES: L>R LE edema (chronic)  PSYCH: Nl behavior, nl affect  NEUROLOGY: AAOx3, non-focal, cranial nerves intact  SKIN: Normal color, No rashes or lesions    CXR: Personally reviewed    Labs: Personally reviewed                        9.6    6.71  )-----------( 177      ( 20 Feb 2021 10:08 )             31.0     02-20    136  |  98  |  42<H>  ----------------------------<  167<H>  5.3   |  27  |  1.17    Ca    9.3      20 Feb 2021 10:08  Mg     2.2     02-20    TPro  7.2  /  Alb  4.0  /  TBili  0.4  /  DBili  x   /  AST  26  /  ALT  19  /  AlkPhos  62  02-20    PT/INR - ( 20 Feb 2021 10:08 )   PT: 21.3 sec;   INR: 1.83 ratio         PTT - ( 20 Feb 2021 10:08 )  PTT:34.3 sec    CARDIAC MARKERS ( 20 Feb 2021 10:08 )  51 ng/L / x     / x     / x     / x     / x            Serum Pro-Brain Natriuretic Peptide: 5598 pg/mL (02-20 @ 10:08)           In-House Cardiology Consult Note  ---------------------------------------------    HPI:  89yo woman severe AS s/p TAVR 1/2021 with #29 CLAUDIA Evolut Pro Plus bioprosthesis, HTN, T2DM, carotid disease and CAD s/p PCI to LAD in 3/2015. She has chronic LE edema L>R usually. NEELIMA was 0.9, mGr 57, pGr 91, AoV 4.3 and moderate MR. Had baseline LBBB.    CLAUDIA was performed and because of 1st AVB/LBBB, PPM was implanted preemptively for impending AVB.     She presents today with 3-4 day of RIVAS. Her baseline ET is limited, she ambulates with walker assistance. She noticed that walking around her house had become tougher than usual for her. Her daughter notes that she may have missed a few doses of her home diuretic (Lasix 40mg PO daily)- taken to maintain euvolemia for HFpEF (moderate LV concentric hypertrophy).    Telemetry: NSR with PVCs    PMHx:   Varicose veins of lower extremity  Glaucoma  HLD (hyperlipidemia)  Carotid stenosis  Hypertension  Mitral regurgitation  Aortic stenosis    PSHx:   History of appendectomy  H/O bilateral hip replacements    Allergies:  No Known Allergies    Home Meds:  ASA 81mg  Lipitor 20mg  Digoxin 125 daily  Lasix 40mg PO daily  Toprol XL 50mg daily  Norvasc 2.5mg PO daily    FAMILY HISTORY: Noncontributory    Social History:  Smoking History: N/A  Alcohol Use: N/A  Drug Use: N/A    REVIEW OF SYSTEMS:  CONSTITUTIONAL: No weakness, fevers or chills  EYES/ENT: No visual changes;  No dysphagia  NECK: No pain or stiffness  RESPIRATORY: No cough, wheezing, hemoptysis; No shortness of breath  CARDIOVASCULAR: No chest pain or palpitations; No lower extremity edema  GASTROINTESTINAL: No abdominal or epigastric pain. No nausea, vomiting, or hematemesis; No diarrhea or constipation. No melena or hematochezia.  BACK: No back pain  GENITOURINARY: No dysuria, frequency or hematuria  NEUROLOGICAL: No numbness or weakness  SKIN: No itching, burning, rashes, or lesions   All other review of systems is negative unless indicated above.    Physical Exam:  T(F): 97.7 (02-20), Max: 97.9 (02-20)  HR: 92 (02-20) (85 - 92)  BP: 141/59 (02-20) (141/59 - 141/70)  RR: 21 (02-20)  SpO2: 100% (02-20)    GENERAL: No acute distress, well-developed  HEAD:  Atraumatic, Normocephalic  ENT: conjunctiva and sclera clear, Neck supple, moist mucosa  CHEST/LUNG: Moderate crackles in LE b/l  HEART: Regular rate and rhythm; No rubs, or gallops  ABDOMEN: Soft, Nontender, Nondistended  EXTREMITIES: L>R LE edema (chronic)  PSYCH: Nl behavior, nl affect  NEUROLOGY: AAOx3, non-focal  SKIN: Normal color, No rashes or lesions    CXR: Personally reviewed    Labs: Personally reviewed                        9.6    6.71  )-----------( 177      ( 20 Feb 2021 10:08 )             31.0     02-20    136  |  98  |  42<H>  ----------------------------<  167<H>  5.3   |  27  |  1.17    Ca    9.3      20 Feb 2021 10:08  Mg     2.2     02-20    TPro  7.2  /  Alb  4.0  /  TBili  0.4  /  DBili  x   /  AST  26  /  ALT  19  /  AlkPhos  62  02-20    PT/INR - ( 20 Feb 2021 10:08 )   PT: 21.3 sec;   INR: 1.83 ratio      PTT - ( 20 Feb 2021 10:08 )  PTT:34.3 sec    CARDIAC MARKERS ( 20 Feb 2021 10:08 )  51 ng/L / x     / x     / x     / x     / x        Serum Pro-Brain Natriuretic Peptide: 5598 pg/mL (02-20 @ 10:08)   In-House Cardiology Consult Note  ---------------------------------------------    HPI:  89yo woman severe AS s/p TAVR 1/2021 with #29 CLAUDIA Evolut Pro Plus bioprosthesis, HTN, T2DM, carotid disease, pAF, and CAD s/p PCI to LAD in 3/2015. She has chronic LE edema L>R usually. NEELIMA was 0.9, mGr 57, pGr 91, AoV 4.3 and moderate MR. Had baseline LBBB.    CLAUDIA was performed and because of 1st AVB/LBBB, PPM was implanted preemptively for impending AVB.     She presents today with 3-4 day of RIVAS. Her baseline ET is limited, she ambulates with walker assistance. She noticed that walking around her house had become tougher than usual for her. Her daughter notes that she may have missed a few doses of her home diuretic (Lasix 40mg PO daily)- taken to maintain euvolemia for HFpEF (moderate LV concentric hypertrophy).    Telemetry: NSR with PVCs    PMHx:   Varicose veins of lower extremity  Glaucoma  HLD (hyperlipidemia)  Carotid stenosis  Hypertension  Mitral regurgitation  Aortic stenosis    PSHx:   History of appendectomy  H/O bilateral hip replacements    Allergies:  No Known Allergies    Home Meds:  ASA 81mg  Lipitor 20mg  Digoxin 125 daily  Lasix 40mg PO daily  Toprol XL 50mg daily  Norvasc 2.5mg PO daily    FAMILY HISTORY: Noncontributory    Social History:  Smoking History: N/A  Alcohol Use: N/A  Drug Use: N/A    REVIEW OF SYSTEMS:  CONSTITUTIONAL: No weakness, fevers or chills  EYES/ENT: No visual changes;  No dysphagia  NECK: No pain or stiffness  RESPIRATORY: No cough, wheezing, hemoptysis; No shortness of breath  CARDIOVASCULAR: No chest pain or palpitations; No lower extremity edema  GASTROINTESTINAL: No abdominal or epigastric pain. No nausea, vomiting, or hematemesis; No diarrhea or constipation. No melena or hematochezia.  BACK: No back pain  GENITOURINARY: No dysuria, frequency or hematuria  NEUROLOGICAL: No numbness or weakness  SKIN: No itching, burning, rashes, or lesions   All other review of systems is negative unless indicated above.    Physical Exam:  T(F): 97.7 (02-20), Max: 97.9 (02-20)  HR: 92 (02-20) (85 - 92)  BP: 141/59 (02-20) (141/59 - 141/70)  RR: 21 (02-20)  SpO2: 100% (02-20)    GENERAL: No acute distress, well-developed  HEAD:  Atraumatic, Normocephalic  ENT: conjunctiva and sclera clear, Neck supple, moist mucosa  CHEST/LUNG: Moderate crackles in LE b/l  HEART: Regular rate and rhythm; No rubs, or gallops  ABDOMEN: Soft, Nontender, Nondistended  EXTREMITIES: L>R LE edema (chronic)  PSYCH: Nl behavior, nl affect  NEUROLOGY: AAOx3, non-focal  SKIN: Normal color, No rashes or lesions    CXR: Personally reviewed    Labs: Personally reviewed                        9.6    6.71  )-----------( 177      ( 20 Feb 2021 10:08 )             31.0     02-20    136  |  98  |  42<H>  ----------------------------<  167<H>  5.3   |  27  |  1.17    Ca    9.3      20 Feb 2021 10:08  Mg     2.2     02-20    TPro  7.2  /  Alb  4.0  /  TBili  0.4  /  DBili  x   /  AST  26  /  ALT  19  /  AlkPhos  62  02-20    PT/INR - ( 20 Feb 2021 10:08 )   PT: 21.3 sec;   INR: 1.83 ratio      PTT - ( 20 Feb 2021 10:08 )  PTT:34.3 sec    CARDIAC MARKERS ( 20 Feb 2021 10:08 )  51 ng/L / x     / x     / x     / x     / x        Serum Pro-Brain Natriuretic Peptide: 5598 pg/mL (02-20 @ 10:08)

## 2021-02-20 NOTE — CONSULT NOTE ADULT - ATTENDING COMMENTS
89 year old female with past medical history of severe AS s/p TAVR 1/2021, HTN, T2DM, carotid disease and CAD s/p PCI to LAD 3/2015 and HFpEF presenting with acute on chronic decompensated diastolic heart failure in the setting of diuretic non-compliance. pBNP ~6,000, no reference. Diuresis as noted above with IV furosemide 40 mg BID and de-escalate within 1-2 days depending on response, discharge on PO furosemide 40 mg daily is adequate as long as she ensures taking it.     Liliana Ford MD, MPH, MARKELL, RPVI, FACC  Cardiovascular Specialist   [facility] ([facility])  [facility]  c: [phone] (text preferred and/or call)  e: [email or web address]  For all [facility] Cardiology and Cardiovascular Surgery on-service contact/call information, go to [email or web address] and use "[email or web address]" to login.  For outpatient Cardiology appointments, call  [phone] to arrange with a colleague; I do not have outpatient Cardiology clinic. 89 year old female with past medical history of severe AS s/p TAVR 1/2021, HTN, T2DM, pAF (not on A/C), carotid disease and CAD s/p PCI to LAD 3/2015 and HFpEF presenting with acute on chronic decompensated diastolic heart failure in the setting of diuretic non-compliance. pBNP ~6,000, no reference. Diuresis as noted above with IV furosemide 40 mg BID and de-escalate within 1-2 days depending on response, discharge on PO furosemide 40 mg daily is adequate as long as she ensures taking it. A/C for pAF does not seem to be explicitly clarified by EP or structural heart but suspect plan to hold off for discharge due to age and bleed/fall risk; at most 2.5 mg BID apixaban (modified order for the time being).     Liliana Ford MD, MPH, MARKELL, RPVI, [facility]  Cardiovascular Specialist   [facility] ([facility])  [facility]  c: [phone] (text preferred and/or call)  e: [email or web address]  For all [facility] Cardiology and Cardiovascular Surgery on-service contact/call information, go to [email or web address] and use "[email or web address]" to login.  For outpatient Cardiology appointments, call  [phone] to arrange with a colleague; I do not have outpatient Cardiology clinic.

## 2021-02-20 NOTE — ED ADULT TRIAGE NOTE - CHIEF COMPLAINT QUOTE
sob x4 days upon exertion  denies fever/sick contacts sob x4 days upon exertion  denies fever/cough/sick contacts

## 2021-02-20 NOTE — CONSULT NOTE ADULT - SUBJECTIVE AND OBJECTIVE BOX
History of Present Illness:  This is a 89y/o female with Hx of baseline LBBB, severe AS s/p TAVR 1/27/21 c/b prolonged QRS s/p PPM implant, HTN, T2DM, carotid disease and CAD s/p PCI to LAD in 3/2015. She has chronic LE edema L>R usually, was on Lasix 40 PO daily at home. Pt now presenting with c/o RIVAS. CT Surgery consulted for further evaluation. TTE performed at bedside, mod MR, LVOT TVI 20.4 cm. LVOTd 1.92 cm, Aortic TVI 32.9 cm, mean gradient 6 mmHg, Aortic valve area by continuity 1.8 cm2. Mild paravalvular aortic regurgitation. Normal pericardium with small pericardial effusion.       Past Medical History  History of varicose veins of lower extremity    Glaucoma    HLD (hyperlipidemia)    Carotid stenosis    Hypertension    Mitral regurgitation    Aortic stenosis  severe      Past Surgical History  History of appendectomy    H/O bilateral hip replacements  20 years ago    Vital Signs Last 24 Hrs  T(C): 36.5 (02-20-21 @ 09:14), Max: 36.6 (02-20-21 @ 08:44)  T(F): 97.7 (02-20-21 @ 09:14), Max: 97.9 (02-20-21 @ 08:44)  HR: 92 (02-20-21 @ 09:14) (85 - 92)  BP: 141/59 (02-20-21 @ 09:14) (141/59 - 141/70)  RR: 21 (02-20-21 @ 11:49) (21 - 27)  SpO2: 100% (02-20-21 @ 11:49) (100% - 100%)             Height (cm): 162.6     Weight (kg): 88.5    BMI (kg/m2): 33.5   (20 Feb 2021 08:44)      Allergies: No Known Allergies        Review of Systems  GENERAL:  no weakness, fatigue, fevers or chills  NEURO: no dizziness, numbness, tingling or weakness  SKIN: no itching, burning, rashes, or lesions   HEENT: no visual changes;  no headache, no vertigo, no recent colds  RESPIRATORY: +RIVAS, no cough, sputum, wheezing  CARDIOVASCULAR:  no chest pain,  or palpitations  GI: no abd pain. no N/V/D.  PERIPHERAL VASCULAR: no swelling, no tenderness, no erythema      PHYSICAL EXAM  Neurology: A&Ox3, NAD  CV : RRR+S1S2  Lungs: Respirations non-labored, B/L BS clear with bibasilar crackles  Abdomen: Soft, NT/ND, +BSx4Q  : Voiding without difficulty  Extremities: B/L LE +1-2 pitting edema, negative calf tenderness, +PP                   B/L groins soft, CDI ANISH, no bleeding, no hematoma                                                            LABS:                        9.6    6.71  )-----------( 177      ( 20 Feb 2021 10:08 )             31.0     136  |  98  |  42<H>  ----------------------------<  167<H>  5.3   |  27  |  1.17    Ca    9.3      20 Feb 2021 10:08  Mg     2.2     02-20    TPro  7.2  /  Alb  4.0  /  TBili  0.4  /  DBili  x   /  AST  26  /  ALT  19  /  AlkPhos  62  02-20    PT/INR - ( 20 Feb 2021 10:08 )   PT: 21.3 sec;   INR: 1.83 ratio         PTT - ( 20 Feb 2021 10:08 )  PTT:34.3 sec      < from: Transthoracic Echocardiogram (02.20.21 @ 11:27) >  Observations:  Mitral Valve: Mitral annular calcification. Moderate mitral  regurgitation.  Aortic Valve/Aorta: s/p transcatheter aortic valve  replacement, with normal gradient:  ---LVOT TVI 20.4 cm. LVOTd 1.92 cm.  ---Aortic TVI 32.9 cm, mean gradient 6 mmHg.  ---Aortic valve area by continuity 1.8 cm2.  Mild paravalvular aortic regurgitation.  Normal aortic root size.  Left Atrium: Severely dilated left atrium.  Left Ventricle: Normal left ventricular internal  dimensions. Discrete upper septal hypertrophy.  Normal left ventricular systolic function. EF 65%. No segmental  wall motion abnormalities.  Right Heart: Normal right atrium. Normal right ventricular  size and systolic function.  A device wire is noted in the right heart.  Normal tricuspid valve. Mild tricuspid regurgitation.  Normal pulmonic valve. Minimal pulmonic regurgitation.  Pericardium/Pleura: Normal pericardium with small  pericardial effusion.  Hemodynamic: No evidence of pulmonary hypertension.   History of Present Illness:  This is a 89y/o female with Hx of baseline LBBB, severe AS s/p TAVR 1/27/21 c/b prolonged QRS s/p PPM implant, HTN, T2DM, carotid disease and CAD s/p PCI to LAD in 3/2015. She has chronic LE edema L>R usually, was on Lasix 40 PO daily at home. Pt now presenting with c/o RIVAS. CT Surgery consulted for further evaluation. TTE performed at bedside, mod MR, LVOT TVI 20.4 cm. LVOTd 1.92 cm, Aortic TVI 32.9 cm, mean gradient 6 mmHg, Aortic valve area by continuity 1.8 cm2. Mild paravalvular aortic regurgitation. Normal pericardium with small pericardial effusion.       Past Medical History  History of varicose veins of lower extremity    Glaucoma    HLD (hyperlipidemia)    Carotid stenosis    Hypertension    Mitral regurgitation    Aortic stenosis  severe      Past Surgical History  History of appendectomy    H/O bilateral hip replacements  20 years ago    Vital Signs Last 24 Hrs  T(C): 36.5 (02-20-21 @ 09:14), Max: 36.6 (02-20-21 @ 08:44)  T(F): 97.7 (02-20-21 @ 09:14), Max: 97.9 (02-20-21 @ 08:44)  HR: 92 (02-20-21 @ 09:14) (85 - 92)  BP: 141/59 (02-20-21 @ 09:14) (141/59 - 141/70)  RR: 21 (02-20-21 @ 11:49) (21 - 27)  SpO2: 100% (02-20-21 @ 11:49) (100% - 100%)             Height (cm): 162.6     Weight (kg): 88.5    BMI (kg/m2): 33.5   (20 Feb 2021 08:44)      Allergies: No Known Allergies        Review of Systems  GENERAL:  no weakness, fatigue, fevers or chills  NEURO: no dizziness, numbness, tingling or weakness  SKIN: no itching, burning, rashes, or lesions   HEENT: no visual changes;  no headache, no vertigo, no recent colds  RESPIRATORY: +RIVAS, no cough, sputum, wheezing  CARDIOVASCULAR:  no chest pain,  or palpitations  GI: no abd pain. no N/V/D.  PERIPHERAL VASCULAR: no swelling, no tenderness, no erythema      PHYSICAL EXAM  Neurology: A&Ox3, NAD  CV : RRR+S1S2  Lungs: Respirations non-labored, B/L BS clear with bibasilar crackles  Abdomen: Soft, NT/ND, +BSx4Q  : Voiding without difficulty  Extremities: B/L LE +1-2 pitting edema, negative calf tenderness, +PP                   B/L groins soft, CDI ANISH, no bleeding, no hematoma                                                            LABS:                        9.6    6.71  )-----------( 177      ( 20 Feb 2021 10:08 )             31.0     136  |  98  |  42<H>  ----------------------------<  167<H>  5.3   |  27  |  1.17    Ca    9.3      20 Feb 2021 10:08  Mg     2.2     02-20    TPro  7.2  /  Alb  4.0  /  TBili  0.4  /  DBili  x   /  AST  26  /  ALT  19  /  AlkPhos  62  02-20    PT/INR - ( 20 Feb 2021 10:08 )   PT: 21.3 sec;   INR: 1.83 ratio         PTT - ( 20 Feb 2021 10:08 )  PTT:34.3 sec      < from: Xray Chest 1 View AP/PA (02.20.21 @ 09:39) >  FINDINGS:  Left-sided dual-chamber pacemaker.  TAVR.  The heart is enlarged.  Calcification of the aortic knob.  The lungs are clear.  Trace right pleural effusion.  No pneumothorax.        < from: Transthoracic Echocardiogram (02.20.21 @ 11:27) >  Observations:  Mitral Valve: Mitral annular calcification. Moderate mitral  regurgitation.  Aortic Valve/Aorta: s/p transcatheter aortic valve  replacement, with normal gradient:  ---LVOT TVI 20.4 cm. LVOTd 1.92 cm.  ---Aortic TVI 32.9 cm, mean gradient 6 mmHg.  ---Aortic valve area by continuity 1.8 cm2.  Mild paravalvular aortic regurgitation.  Normal aortic root size.  Left Atrium: Severely dilated left atrium.  Left Ventricle: Normal left ventricular internal  dimensions. Discrete upper septal hypertrophy.  Normal left ventricular systolic function. EF 65%. No segmental  wall motion abnormalities.  Right Heart: Normal right atrium. Normal right ventricular  size and systolic function.  A device wire is noted in the right heart.  Normal tricuspid valve. Mild tricuspid regurgitation.  Normal pulmonic valve. Minimal pulmonic regurgitation.  Pericardium/Pleura: Normal pericardium with small  pericardial effusion.  Hemodynamic: No evidence of pulmonary hypertension.

## 2021-02-20 NOTE — H&P ADULT - NSHPLABSRESULTS_GEN_ALL_CORE
LABS:                        9.6    6.71  )-----------( 177      ( 20 Feb 2021 10:08 )             31.0     02-20    136  |  98  |  42<H>  ----------------------------<  167<H>  5.3   |  27  |  1.17    Ca    9.3      20 Feb 2021 10:08  Mg     2.2     02-20    TPro  7.2  /  Alb  4.0  /  TBili  0.4  /  DBili  x   /  AST  26  /  ALT  19  /  AlkPhos  62  02-20    PT/INR - ( 20 Feb 2021 10:08 )   PT: 21.3 sec;   INR: 1.83 ratio         PTT - ( 20 Feb 2021 10:08 )  PTT:34.3 sec          RADIOLOGY & ADDITIONAL TESTS:

## 2021-02-20 NOTE — H&P ADULT - NSICDXPASTMEDICALHX_GEN_ALL_CORE_FT
PAST MEDICAL HISTORY:  Aortic stenosis severe    Carotid stenosis     Glaucoma     History of varicose veins of lower extremity     HLD (hyperlipidemia)     Hypertension     Mitral regurgitation

## 2021-02-20 NOTE — ED PROVIDER NOTE - CLINICAL SUMMARY MEDICAL DECISION MAKING FREE TEXT BOX
89F w/ hx of anemia, HTN, HLD, ASHD, carotid stenosis, MR and severe aortic stenosis s/p tavr 1/27/2021,  PM placed on 1/31/2021 presenting with RIVAS. EKG showing frequent PVCs concerning for nonsustained V-tach. Based on physical exam - concern for fluid overload. Will consult CT surg/cardiology given recent procedures for further evaluation.

## 2021-02-20 NOTE — ED PROVIDER NOTE - OBJECTIVE STATEMENT
89F w/ hx of anemia, HTN, HLD, ASHD, carotid stenosis, MR and severe aortic stenosis s/p tavr 1/27/2021, s/p pacemaker 1/31/2021 presenting from home with RIVAS x 4 days. Patient reports she has been having increasing SOB over the last 4 days, worse with exertion. Patient reports she began having SOB at rest yesterday evening which prompted her to go to the ED for evaluation. Patient denies fever/chills, chest pain, N/V/D/C. Patient denies sick contacts or recent COVID exposure.

## 2021-02-20 NOTE — ED PROVIDER NOTE - ATTENDING CONTRIBUTION TO CARE
89F w/ hx of anemia, HTN, HLD, ASHD, carotid stenosis, MR and severe aortic stenosis s/p tavr 1/27/2021, here w/ RIVAS,   PM placed on 1/31 89F w/ hx of anemia, HTN, HLD, ASHD, carotid stenosis, MR and severe aortic stenosis s/p tavr 1/27/2021, here w/ RIVAS,   PM placed on 1/31  Eliquis- for afib  Lasix 20 mg once a day (  metorpolol  atorvastatin  no hx oxygen,  nonsmoker,   no diarrhea, normal PO intake but w/ possible 5 lb weight gain in the past week.   I have reviewed the triage vital signs. tachypneic  Const: Well-nourished, Well-developed, appearing stated age  Head: atraumatic, normocephalic  Eyes: no conjunctival injection and no scleral icterus  ENMT: Atraumatic external nose and ears, Moist mucus membranes  Neck: Symmetric, trachea midline,  CVS: +S1/S2, dorsalis pedis/radial pulse 2+ bilaterally  RESP: tachypneic w/ decreased breath sounds at the base,   GI: Nontender/Nondistended, soft abdomen  MSK: Extremities w/o deformity or ttp   Skin: Warm, Dry w/ no rashes  Neuro: GCS=15, motor in all 4 extremities equal, Sensation grossly intact   Psych: Awake, Alert, & Orientedx3;  Appropriate mood and affect, cooperative  Suspect volume overload, will have labs, xray and consult w/ cardiology/ctsx

## 2021-02-20 NOTE — ED PROVIDER NOTE - PROGRESS NOTE DETAILS
Beba Sprague MD phone call to daughter, who states that she is sob w/ walking, when turning in bed she has episodes, she develops trouble breathing and then is breathing very quickly, Beba Sprague MD spoke w/ BAL harrington from CT sx, will be down to see the patient Quyen Cameron MD spoke with Katerina (cardiology fellow), will be down to see patient. Beba Sprague MD pt w/ improvement in RR pt states she feels much better, awaiting consultations from CT sx and cardiology for further recommendations, mild co2 retention, but pt not clinically hypercapenic

## 2021-02-20 NOTE — ED PROVIDER NOTE - PMH
Aortic stenosis  severe  Carotid stenosis    Glaucoma    History of varicose veins of lower extremity    HLD (hyperlipidemia)    Hypertension    Mitral regurgitation

## 2021-02-20 NOTE — H&P ADULT - ASSESSMENT
89F w/ hx of anemia, HTN, HLD, ASHD, carotid stenosis, MR and severe aortic stenosis s/p tavr 1/27/2021, s/p pacemaker 1/31/2021 presenting from home with RIVAS x 4 days. Patient reports she has been having increasing SOB over the last 4 days, worse with exertion. Patient reports she began having SOB at rest yesterday evening which prompted her to go to the ED for evaluation. Patient denies fever/chills, chest pain, N/V/D/C. Patient denies sick contacts or recent COVID exposure.    Decompensated HFpEF (moderate LV concentric hypertrophy)  -Likely 2/2 to inadequate diuresis and missed doses of Lasix.  -Presenting with VOL, bibasilar crackles; diuresis with Lasix 40mg IV BID for now.  -Continue GDMT: Toprol XL 50mg PO daily.  -She is in NSR, would D/c digoxin.   -Strict ins and outs and daily weights.  -Replete lytes PRN, Mg=2, Phos=3, and K=4.  -2g Na restricted diet.  -Continue telemetry monitoring.    HTN  - continue Norvasc 2.5mg PO daily.    ASCVD risk  -Can continue ASA 81mg PO daily  -Lipitor 20mg PO daily.    ? h/o afib  - c/w Eliquis    anemia  - iron studies    dvt px  - on eliquis    PT eval

## 2021-02-20 NOTE — ED ADULT NURSE NOTE - NSIMPLEMENTINTERV_GEN_ALL_ED
Implemented All Fall with Harm Risk Interventions:  Amo to call system. Call bell, personal items and telephone within reach. Instruct patient to call for assistance. Room bathroom lighting operational. Non-slip footwear when patient is off stretcher. Physically safe environment: no spills, clutter or unnecessary equipment. Stretcher in lowest position, wheels locked, appropriate side rails in place. Provide visual cue, wrist band, yellow gown, etc. Monitor gait and stability. Monitor for mental status changes and reorient to person, place, and time. Review medications for side effects contributing to fall risk. Reinforce activity limits and safety measures with patient and family. Provide visual clues: red socks.

## 2021-02-21 LAB
ANION GAP SERPL CALC-SCNC: 11 MMOL/L — SIGNIFICANT CHANGE UP (ref 5–17)
BUN SERPL-MCNC: 40 MG/DL — HIGH (ref 7–23)
CALCIUM SERPL-MCNC: 9.4 MG/DL — SIGNIFICANT CHANGE UP (ref 8.4–10.5)
CHLORIDE SERPL-SCNC: 103 MMOL/L — SIGNIFICANT CHANGE UP (ref 96–108)
CO2 SERPL-SCNC: 24 MMOL/L — SIGNIFICANT CHANGE UP (ref 22–31)
CREAT SERPL-MCNC: 1.01 MG/DL — SIGNIFICANT CHANGE UP (ref 0.5–1.3)
FERRITIN SERPL-MCNC: 484 NG/ML — HIGH (ref 15–150)
FOLATE SERPL-MCNC: >20 NG/ML — SIGNIFICANT CHANGE UP
GLUCOSE SERPL-MCNC: 111 MG/DL — HIGH (ref 70–99)
IRON SATN MFR SERPL: 14 % — SIGNIFICANT CHANGE UP (ref 14–50)
IRON SATN MFR SERPL: 41 UG/DL — SIGNIFICANT CHANGE UP (ref 30–160)
MAGNESIUM SERPL-MCNC: 2.1 MG/DL — SIGNIFICANT CHANGE UP (ref 1.6–2.6)
PHOSPHATE SERPL-MCNC: 4.1 MG/DL — SIGNIFICANT CHANGE UP (ref 2.5–4.5)
POTASSIUM SERPL-MCNC: 5.3 MMOL/L — SIGNIFICANT CHANGE UP (ref 3.5–5.3)
POTASSIUM SERPL-SCNC: 5.3 MMOL/L — SIGNIFICANT CHANGE UP (ref 3.5–5.3)
SARS-COV-2 IGG SERPL QL IA: NEGATIVE — SIGNIFICANT CHANGE UP
SARS-COV-2 IGM SERPL IA-ACNC: <0.1 INDEX — SIGNIFICANT CHANGE UP
SODIUM SERPL-SCNC: 138 MMOL/L — SIGNIFICANT CHANGE UP (ref 135–145)
TIBC SERPL-MCNC: 301 UG/DL — SIGNIFICANT CHANGE UP (ref 220–430)
TSH SERPL-MCNC: 0.85 UIU/ML — SIGNIFICANT CHANGE UP (ref 0.27–4.2)
UIBC SERPL-MCNC: 260 UG/DL — SIGNIFICANT CHANGE UP (ref 110–370)
VIT B12 SERPL-MCNC: 708 PG/ML — SIGNIFICANT CHANGE UP (ref 232–1245)

## 2021-02-21 RX ORDER — POTASSIUM CHLORIDE 20 MEQ
40 PACKET (EA) ORAL EVERY 4 HOURS
Refills: 0 | Status: DISCONTINUED | OUTPATIENT
Start: 2021-02-21 | End: 2021-02-21

## 2021-02-21 RX ADMIN — AMLODIPINE BESYLATE 2.5 MILLIGRAM(S): 2.5 TABLET ORAL at 05:50

## 2021-02-21 RX ADMIN — LATANOPROST 1 DROP(S): 0.05 SOLUTION/ DROPS OPHTHALMIC; TOPICAL at 21:14

## 2021-02-21 RX ADMIN — Medication 81 MILLIGRAM(S): at 11:41

## 2021-02-21 RX ADMIN — APIXABAN 5 MILLIGRAM(S): 2.5 TABLET, FILM COATED ORAL at 17:10

## 2021-02-21 RX ADMIN — Medication 40 MILLIGRAM(S): at 17:10

## 2021-02-21 RX ADMIN — Medication 50 MILLIGRAM(S): at 06:03

## 2021-02-21 RX ADMIN — MONTELUKAST 10 MILLIGRAM(S): 4 TABLET, CHEWABLE ORAL at 11:41

## 2021-02-21 RX ADMIN — Medication 40 MILLIEQUIVALENT(S): at 05:46

## 2021-02-21 RX ADMIN — APIXABAN 5 MILLIGRAM(S): 2.5 TABLET, FILM COATED ORAL at 05:50

## 2021-02-21 RX ADMIN — Medication 40 MILLIGRAM(S): at 05:50

## 2021-02-21 RX ADMIN — ATORVASTATIN CALCIUM 20 MILLIGRAM(S): 80 TABLET, FILM COATED ORAL at 21:14

## 2021-02-21 NOTE — PHYSICAL THERAPY INITIAL EVALUATION ADULT - GAIT DEVIATIONS NOTED, PT EVAL
crouch/decreased eva/increased time in double stance/decreased velocity of limb motion/decreased step length/decreased stride length/decreased swing-to-stance ratio/decreased weight-shifting ability

## 2021-02-21 NOTE — PROGRESS NOTE ADULT - ASSESSMENT
89F w/ hx of anemia, HTN, HLD, ASHD, carotid stenosis, MR and severe aortic stenosis s/p tavr 1/27/2021, s/p pacemaker 1/31/2021 presenting from home with RIVAS x 4 days. Patient reports she has been having increasing SOB over the last 4 days, worse with exertion. Patient reports she began having SOB at rest yesterday evening which prompted her to go to the ED for evaluation. Patient denies fever/chills, chest pain, N/V/D/C. Patient denies sick contacts or recent COVID exposure.    Decompensated HFpEF (moderate LV concentric hypertrophy)  -Likely 2/2 to inadequate diuresis and missed doses of Lasix.  -Presenting with VOL, bibasilar crackles; diuresis with Lasix 40mg IV BID for now.  -Continue GDMT: Toprol XL 50mg PO daily.  -She is in NSR, .   -Strict ins and outs and daily weights.  -Replete lytes PRN, Mg=2, Phos=3, and K=4.  -2g Na restricted diet.  -Continue telemetry monitoring.    HTN  - continue Norvasc 2.5mg PO daily.    ASCVD risk  -Can continue ASA 81mg PO daily  -Lipitor 20mg PO daily.    ? h/o afib  - c/w Eliquis    anemia  - iron studies    dvt px  - on eliquis    PT eval

## 2021-02-21 NOTE — PROGRESS NOTE ADULT - SUBJECTIVE AND OBJECTIVE BOX
DATE OF SERVICE: 02-21-21 @ 11:20    Patient is a 89y old  Female who presents with a chief complaint of SOB (20 Feb 2021 18:05)      SUBJECTIVE / OVERNIGHT EVENTS:  No chest pain. No shortness of breath. No complaints. No events overnight. feeling much better.    MEDICATIONS  (STANDING):  amLODIPine   Tablet 2.5 milliGRAM(s) Oral daily  apixaban 5 milliGRAM(s) Oral every 12 hours  aspirin enteric coated 81 milliGRAM(s) Oral daily  atorvastatin 20 milliGRAM(s) Oral at bedtime  furosemide   Injectable 40 milliGRAM(s) IV Push two times a day  latanoprost 0.005% Ophthalmic Solution 1 Drop(s) Both EYES at bedtime  metoprolol succinate ER 50 milliGRAM(s) Oral daily  montelukast 10 milliGRAM(s) Oral daily    MEDICATIONS  (PRN):      Vital Signs Last 24 Hrs  T(C): 36.7 (21 Feb 2021 05:00), Max: 36.7 (21 Feb 2021 05:00)  T(F): 98 (21 Feb 2021 05:00), Max: 98 (21 Feb 2021 05:00)  HR: 94 (21 Feb 2021 05:00) (79 - 94)  BP: 139/59 (21 Feb 2021 05:00) (118/76 - 145/69)  BP(mean): --  RR: 18 (21 Feb 2021 05:00) (18 - 24)  SpO2: 99% (21 Feb 2021 05:00) (99% - 100%)  CAPILLARY BLOOD GLUCOSE        I&O's Summary    20 Feb 2021 07:01  -  21 Feb 2021 07:00  --------------------------------------------------------  IN: 233 mL / OUT: 600 mL / NET: -367 mL        PHYSICAL EXAM:  GENERAL: NAD, well-developed  HEAD:  Atraumatic, Normocephalic  EYES: EOMI, PERRLA, conjunctiva and sclera clear  NECK: Supple, No JVD  CHEST/LUNG: Clear to auscultation bilaterally; No wheeze  HEART: Regular rate and rhythm; No murmurs, rubs, or gallops  ABDOMEN: Soft, Nontender, Nondistended; Bowel sounds present  EXTREMITIES:  2+ Peripheral Pulses, No clubbing, cyanosis, or edema has resolved  PSYCH: AAOx3  NEUROLOGY: non-focal  SKIN: No rashes or lesions    LABS:                        9.6    6.71  )-----------( 177      ( 20 Feb 2021 10:08 )             31.0     02-21    138  |  103  |  40<H>  ----------------------------<  111<H>  5.3   |  24  |  1.01    Ca    9.4      21 Feb 2021 07:34  Phos  4.1     02-21  Mg     2.1     02-21    TPro  7.2  /  Alb  4.0  /  TBili  0.4  /  DBili  x   /  AST  26  /  ALT  19  /  AlkPhos  62  02-20    PT/INR - ( 20 Feb 2021 10:08 )   PT: 21.3 sec;   INR: 1.83 ratio         PTT - ( 20 Feb 2021 10:08 )  PTT:34.3 sec          RADIOLOGY & ADDITIONAL TESTS:    Imaging Personally Reviewed:    Consultant(s) Notes Reviewed:      Care Discussed with Consultants/Other Providers:

## 2021-02-21 NOTE — PHYSICAL THERAPY INITIAL EVALUATION ADULT - DIAGNOSIS, PT EVAL
Pt presents with decreased strength, decreased balance, and decreased endurance limiting overall independence with mobility.

## 2021-02-21 NOTE — PHYSICAL THERAPY INITIAL EVALUATION ADULT - PERSONAL SAFETY AND JUDGMENT, REHAB EVAL
Inpatient consult to Primary Care Provider  Consult performed by: Russell Srivastava MD  Consult ordered by: Brenda Mancera MD        Inpatient Consult Note 5/13/19    Patient:  Sally Cabello  MRN: [id number]    Reason for Consultation:  Post-operative medical management    Requesting Physician: Dr. Marissa Shaffer    History Obtained From:  patient, electronic medical record  PCP: FATEMEH Angel CNP    History of Present Illness: The patient is a 61 y.o. female who underwent an elective right total Knee replacement by Dr. Marissa Shaffer for degenerative arthritis and pain which was uncontrolled with outpatient conservative management. Post-op course thus far has been uncomplicated. Her pain is controlled. H/o COPD, hypothyroid. Past Medical History:        Diagnosis Date    Arthritis of knee, degenerative     COPD (chronic obstructive pulmonary disease) ([facility] [address]     Diverticulosis 2014    mild left-sided    Hypothyroidism     Lumbar back pain     Seasonal allergies     SOB (shortness of breath) on exertion        Past Surgical History:        Procedure Laterality Date    APPENDECTOMY  1985   [id number] [facility] COLONOSCOPY  10/15/2014    mild left-sided colonoscopy    DILATION AND CURETTAGE OF UTERUS N/A 9/20/2017    DILATATION AND CURETTAGE HYSTEROSCOPY, POLYPECTOMY performed by Mp Mcgill MD at [address] 05/13/2019    Dr. Sergio Guadalupe  ~ 2003       Medications Prior to Admission:    Prior to Admission medications    Medication Sig Start Date End Date Taking?  Authorizing Provider   ibuprofen (ADVIL;MOTRIN) 200 MG tablet Take 800 mg by mouth every 8 hours as needed for Pain   Yes Historical Provider, MD   loratadine (CLARITIN) 10 MG capsule Take 10 mg by mouth daily   Yes Historical Provider, MD   aspirin 325 MG tablet Take 325 mg by mouth every 4 hours as needed for Pain   Yes Historical Provider, MD SYNTHROID 112 MCG tablet Take 112 mcg by mouth Daily  8/1/18  Yes Historical Provider, MD   spironolactone-hydrochlorothiazide (ALDACTAZIDE) 25-25 MG per tablet Take 1 tablet by mouth daily 7/2/15  Yes Indu Noriega MD   albuterol (PROAIR HFA) 108 (90 BASE) MCG/ACT inhaler Inhale 2 puffs into the lungs every 6 hours as needed for Wheezing 6/18/15  Yes Indu Noriega MD   tiotropium (Tivis Kehr) 18 MCG inhalation capsule INHALE THE CONTENTS ONE CAPSULE BY MOUTH DAILY USING THE HANDIHALER DEVICE 6/18/15  Yes Indu Noriega MD   naproxen sodium (ALEVE) 220 MG tablet Take 550 mg by mouth 2 times daily (with meals) 3 tab   Yes Historical Provider, MD       Allergies:  Patient has no known allergies. Social History:   TOBACCO:   reports that she has quit smoking. Her smoking use included cigarettes. She quit after 20.00 years of use. She has never used smokeless tobacco.  ETOH:   reports that she drinks alcohol.     Family History:       Problem Relation Age of Onset    Cancer Mother         breast    Cancer Maternal Grandmother         breast       Review of Systems:  Constitutional: negative for chills and fevers  ENT: negative for nasal congestion, sore throat and voice change  Respiratory: negative for shortness of breath or cough  Cardiovascular: negative for chest pain and palpitations  Gastrointestinal: negative for abdominal pain, nausea, vomiting, diarrhea or constipation  Genitourinary:negative for dysuria, urgency or frequency  Musculoskeletal: positive for joint pain  Skin: negative for rashes or skin lesions  Neurological: negative for weakness, numbness or tingling    Objective:    Vitals:   Temp: 97.3 °F (36.3 °C)  BP: (!) 101/40  Resp: 18  Pulse: 69  SpO2: 97 %  24HR INTAKE/OUTPUT:      Intake/Output Summary (Last 24 hours) at 5/13/2019 1428  Last data filed at 5/13/2019 1225  Gross per 24 hour   Intake 4000 ml   Output 500 ml   Net 3500 ml -----------------------------------------------------------------  Exam:  GEN:    Awake, alert and oriented x 3. no acute distress  EYES:  EOMI, pupils equal   NECK:  Supple. No lymphadenopathy. No carotid bruit  CVS:    RRR, no murmur, rub or gallop  PULM:  CTA, no wheezes, rales or rhonchi  ABD:    Bowels sounds normal.  Abdomen is soft. No distention. No tenderness. EXT:   no  edema. No calf tenderness  NEURO: Follows commands, ESPITIA, Motor and sensory are intact  SKIN:   Incision is dressed. No surrounding erythema noted. -----------------------------------------------------------------  Diagnostic Data:    · All available data reviewed  Lab Results   Component Value Date    WBC 5.9 04/16/2019    HGB 14.9 04/16/2019    MCV 91.0 04/16/2019     04/16/2019      Lab Results   Component Value Date    GLUCOSE 92 04/16/2019    BUN 21 04/16/2019    CREATININE 0.63 04/16/2019     04/16/2019    K 3.8 04/16/2019    CALCIUM 9.3 04/16/2019     04/16/2019    CO2 30 04/16/2019       Active Problems: Morbid obesity with BMI of 40.0-44.9, adult ([facility])    Hypothyroidism    COPD (chronic obstructive pulmonary disease) ([facility])    Primary osteoarthritis of right knee  Resolved Problems:    * No resolved hospital problems. *      Assessment:      · Post-op medical management of COPD, hypothyroid, MO  · Status post right total Knee arthroplasty      Recommendation:     · Agree with post-op orders as written by Dr. Tere Pina  · Agree with current post-op DVT prophylaxis orders  · Pain control as ordered per Dr. Tere Pina  · Prn nebs  · Monitor vitals  · Home meds  · H/H in am  · High risk medication: none    CORE MEASURES  DVT prophylaxis: xarelto  Decubitus ulcer present on admission: No  CODE STATUS: FULL CODE  Nutrition Status: good   Physical therapy: Yes   Old Charts reviewed: Yes  EKG Reviewed: Yes  Advance Directive Addressed:  Yes    Rk Greer PA-C  5/13/2019, 2:28 PM intact

## 2021-02-21 NOTE — PHYSICAL THERAPY INITIAL EVALUATION ADULT - PERTINENT HX OF CURRENT PROBLEM, REHAB EVAL
89F w/ hx of anemia, HTN, HLD, ASHD, carotid stenosis, MR and severe aortic stenosis s/p tavr 1/27/2021, s/p pacemaker 1/31/2021 presenting from home with RIVAS x 4 days. Pt denies fever/chills, chest pain, N/V/D/C, sick contacts or recent COVID exposure.

## 2021-02-21 NOTE — PHYSICAL THERAPY INITIAL EVALUATION ADULT - ADDITIONAL COMMENTS
Pt lives with her daughter in a private house with 2 steps to enter. Pt used RW or single axis cane in house prior to admit. Pt lives with her daughter in a private house with no steps to negotiate Pt used rollator in house prior to admit.

## 2021-02-22 ENCOUNTER — TRANSCRIPTION ENCOUNTER (OUTPATIENT)
Age: 86
End: 2021-02-22

## 2021-02-22 VITALS
DIASTOLIC BLOOD PRESSURE: 89 MMHG | SYSTOLIC BLOOD PRESSURE: 160 MMHG | RESPIRATION RATE: 18 BRPM | TEMPERATURE: 98 F | HEART RATE: 53 BPM | OXYGEN SATURATION: 99 %

## 2021-02-22 LAB
ANION GAP SERPL CALC-SCNC: 11 MMOL/L — SIGNIFICANT CHANGE UP (ref 5–17)
BUN SERPL-MCNC: 40 MG/DL — HIGH (ref 7–23)
CALCIUM SERPL-MCNC: 9.4 MG/DL — SIGNIFICANT CHANGE UP (ref 8.4–10.5)
CHLORIDE SERPL-SCNC: 97 MMOL/L — SIGNIFICANT CHANGE UP (ref 96–108)
CO2 SERPL-SCNC: 28 MMOL/L — SIGNIFICANT CHANGE UP (ref 22–31)
CREAT SERPL-MCNC: 1.21 MG/DL — SIGNIFICANT CHANGE UP (ref 0.5–1.3)
GLUCOSE SERPL-MCNC: 130 MG/DL — HIGH (ref 70–99)
HCT VFR BLD CALC: 32.7 % — LOW (ref 34.5–45)
HGB BLD-MCNC: 10.4 G/DL — LOW (ref 11.5–15.5)
MAGNESIUM SERPL-MCNC: 2.1 MG/DL — SIGNIFICANT CHANGE UP (ref 1.6–2.6)
MCHC RBC-ENTMCNC: 28.7 PG — SIGNIFICANT CHANGE UP (ref 27–34)
MCHC RBC-ENTMCNC: 31.8 GM/DL — LOW (ref 32–36)
MCV RBC AUTO: 90.1 FL — SIGNIFICANT CHANGE UP (ref 80–100)
NRBC # BLD: 0 /100 WBCS — SIGNIFICANT CHANGE UP (ref 0–0)
PHOSPHATE SERPL-MCNC: 4.6 MG/DL — HIGH (ref 2.5–4.5)
PLATELET # BLD AUTO: 156 K/UL — SIGNIFICANT CHANGE UP (ref 150–400)
POTASSIUM SERPL-MCNC: 4.9 MMOL/L — SIGNIFICANT CHANGE UP (ref 3.5–5.3)
POTASSIUM SERPL-SCNC: 4.9 MMOL/L — SIGNIFICANT CHANGE UP (ref 3.5–5.3)
RBC # BLD: 3.63 M/UL — LOW (ref 3.8–5.2)
RBC # FLD: 13.1 % — SIGNIFICANT CHANGE UP (ref 10.3–14.5)
SODIUM SERPL-SCNC: 136 MMOL/L — SIGNIFICANT CHANGE UP (ref 135–145)
WBC # BLD: 6.09 K/UL — SIGNIFICANT CHANGE UP (ref 3.8–10.5)
WBC # FLD AUTO: 6.09 K/UL — SIGNIFICANT CHANGE UP (ref 3.8–10.5)

## 2021-02-22 PROCEDURE — 99233 SBSQ HOSP IP/OBS HIGH 50: CPT | Mod: GC

## 2021-02-22 RX ORDER — APIXABAN 2.5 MG/1
1 TABLET, FILM COATED ORAL
Qty: 60 | Refills: 0
Start: 2021-02-22 | End: 2021-03-23

## 2021-02-22 RX ORDER — APIXABAN 2.5 MG/1
2.5 TABLET, FILM COATED ORAL
Refills: 0 | Status: DISCONTINUED | OUTPATIENT
Start: 2021-02-22 | End: 2021-02-22

## 2021-02-22 RX ORDER — ACETAMINOPHEN 500 MG
650 TABLET ORAL ONCE
Refills: 0 | Status: COMPLETED | OUTPATIENT
Start: 2021-02-22 | End: 2021-02-22

## 2021-02-22 RX ORDER — FUROSEMIDE 40 MG
1 TABLET ORAL
Qty: 0 | Refills: 0 | DISCHARGE

## 2021-02-22 RX ORDER — APIXABAN 2.5 MG/1
1 TABLET, FILM COATED ORAL
Qty: 0 | Refills: 0 | DISCHARGE

## 2021-02-22 RX ORDER — FUROSEMIDE 40 MG
1 TABLET ORAL
Qty: 60 | Refills: 0
Start: 2021-02-22 | End: 2021-03-23

## 2021-02-22 RX ADMIN — AMLODIPINE BESYLATE 2.5 MILLIGRAM(S): 2.5 TABLET ORAL at 05:04

## 2021-02-22 RX ADMIN — Medication 40 MILLIGRAM(S): at 05:54

## 2021-02-22 RX ADMIN — MONTELUKAST 10 MILLIGRAM(S): 4 TABLET, CHEWABLE ORAL at 12:48

## 2021-02-22 RX ADMIN — Medication 650 MILLIGRAM(S): at 05:54

## 2021-02-22 RX ADMIN — Medication 81 MILLIGRAM(S): at 12:48

## 2021-02-22 RX ADMIN — APIXABAN 2.5 MILLIGRAM(S): 2.5 TABLET, FILM COATED ORAL at 05:04

## 2021-02-22 RX ADMIN — Medication 50 MILLIGRAM(S): at 05:04

## 2021-02-22 NOTE — PROGRESS NOTE ADULT - ASSESSMENT
89yo woman severe AS s/p TAVR 1/2020 with #29 CLAUDIA Evolut Pro Plus bioprosthesis, HTN, T2DM, carotid disease and CAD s/p PCI to LAD in 3/2015. She has chronic LE edema L>R usually. NEELIMA was 0.9, mGr 57, pGr 91, AoV 4.3 and moderate MR. Had baseline LBBB. CLAUDIA was performed and because of 1st AVB/LBBB, PPM was implanted preemptively for impending AVB. Presenting now for RIVAS.    #Decompensated HFpEF (moderate LV concentric hypertrophy) from missed diuretic dose   -would transition to po lasix 40 mg BID   -Continue GDMT: Toprol XL 50mg PO daily, can uptitrate to 75 mg po QD   -Strict ins and outs and daily weights.  -Replete lytes PRN, Mg=2, Phos=3, and K=4.  -2g Na restricted diet.  -Continue telemetry monitoring.    #HTN  -cont norvasc 2.5 mg po qd     #ASCVD risk  -Can continue ASA 81mg PO daily  -Lipitor 20mg PO daily.    PT/OT and OOB as tolerates  No barrier for discharge planning cardiovascular standpoint.     Thank you,     Silvana Ceja MD  Cardiology Fellow  All Cardiology service information can be found 24/7 on [email or web address], password: [email or web address]             89 yo F with severe AS s/p TAVR 1/2020, moderate MR, HTN, LBBB, s/p PPM, and CAD s/p PCI to LAD in 3/2015. She has chronic LE edema L>R usually.   Presented now for RIVAS due to acute decompensation of chronic HFpEF.      REC:  #1.  Decompensated HFpEF (moderate LV concentric hypertrophy) from missed diuretic dose   - would transition to po Lasix 40 mg BID   - Continue GDMT: Toprol XL 50mg PO daily, can uptitrate to 75 mg po QD   - continue strict ins and outs and daily weights.  - replete lytes PRN, Mg=2, Phos=3, and K=4.  - 2g Na restricted diet.  - Continue telemetry monitoring.    #2.  HTN  -cont Norvasc 2.5 mg po qd     #2.  ASCAD  - Continue ASA 81mg PO daily  - Lipitor 20mg PO daily.      PT/OT and OOB as tolerates    No barrier for discharge planning cardiovascular standpoint.     Thank you,       Silvana Ceja MD  Cardiology Fellow    Jorge A Jaimes M.D.  Cardiology Attending, Consult Service    For Cardiology consults and questions, all Cardiology service information can be found 24/7 on [email or web address], password: [email or web address]

## 2021-02-22 NOTE — DISCHARGE NOTE NURSING/CASE MANAGEMENT/SOCIAL WORK - NSDCPEELIQUIS_GEN_ALL_CORE
Apixaban/Eliquis - Compliance/Apixaban/Eliquis - Dietary Advice/Apixaban/Eliquis - Follow up monitoring/Apixaban/Eliquis - Potential for adverse drug reactions and interactions Statement Selected

## 2021-02-22 NOTE — DISCHARGE NOTE PROVIDER - NSDCMRMEDTOKEN_GEN_ALL_CORE_FT
acetaminophen 325 mg oral tablet: 3 tab(s) orally every 8 hours, As Needed  for pain.  Do not exceed 3 doses /day  amLODIPine 2.5 mg oral tablet: 1 tab(s) orally once a day  aspirin 81 mg oral delayed release tablet: 1 tab(s) orally once a day  atorvastatin 20 mg oral tablet: 1 tab(s) orally once a day (at bedtime)  Eliquis 5 mg oral tablet: 1 tab(s) orally 2 times a day  Lasix 40 mg oral tablet: 1 tab(s) orally once a day  Metoprolol Succinate ER 50 mg oral tablet, extended release: 1 tab(s) orally once a day   Singulair 10 mg oral tablet: 1 tab(s) orally once a day   travoprost 0.004% ophthalmic solution: 1 drop(s) to each affected eye once a day (in the evening)   acetaminophen 325 mg oral tablet: 3 tab(s) orally every 8 hours, As Needed  for pain.  Do not exceed 3 doses /day  amLODIPine 2.5 mg oral tablet: 1 tab(s) orally once a day  apixaban 2.5 mg oral tablet: 1 tab(s) orally 2 times a day   aspirin 81 mg oral delayed release tablet: 1 tab(s) orally once a day  atorvastatin 20 mg oral tablet: 1 tab(s) orally once a day (at bedtime)  furosemide 40 mg oral tablet: 1 tab(s) orally 2 times a day   Metoprolol Succinate ER 50 mg oral tablet, extended release: 1 tab(s) orally once a day   Singulair 10 mg oral tablet: 1 tab(s) orally once a day   travoprost 0.004% ophthalmic solution: 1 drop(s) to each affected eye once a day (in the evening)

## 2021-02-22 NOTE — DISCHARGE NOTE PROVIDER - NSDCCPCAREPLAN_GEN_ALL_CORE_FT
PRINCIPAL DISCHARGE DIAGNOSIS  Diagnosis: Heart failure  Assessment and Plan of Treatment: Please follow up with your cardiologist in 2 weeks.  Weigh yourself daily.  If you gain 3lbs in 3 days, or 5lbs in a week call your Health Care Provider.  Do not eat or drink foods containing more than 2000mg of salt (sodium) in your diet every day.  Call your Health Care Provider if you have any swelling or increased swelling in your feet, ankles, and/or stomach.  Take all of your medication as directed.  If you become dizzy call your Health Care Provider.         PRINCIPAL DISCHARGE DIAGNOSIS  Diagnosis: Heart failure  Assessment and Plan of Treatment: Please follow up with your cardiologist in 1 week.  Weigh yourself daily.  If you gain 3lbs in 3 days, or 5lbs in a week call your Health Care Provider.  Do not eat or drink foods containing more than 2000mg of salt (sodium) in your diet every day.  Call your Health Care Provider if you have any swelling or increased swelling in your feet, ankles, and/or stomach.  Take all of your medication as directed.  If you become dizzy call your Health Care Provider.

## 2021-02-22 NOTE — DISCHARGE NOTE PROVIDER - HOSPITAL COURSE
89F w/ hx of anemia, HTN, HLD, ASHD, carotid stenosis, MR and severe aortic stenosis s/p tavr 1/27/2021, s/p pacemaker 1/31/2021 presenting from home with RIVAS x 4 days. Patient reports she has been having increasing SOB over the last 4 days, worse with exertion. Patient reports she began having SOB at rest yesterday evening which prompted her to go to the ED for evaluation. Patient denies fever/chills, chest pain, N/V/D/C. Patient denies sick contacts or recent COVID exposure.    Decompensated HFpEF. Pt was on Lasix 40 mg IV BID. Pt is A. Fib. at rate of 70s - 90s, on Eliquis and Toprol.     Pt is stable now and will be discharged with PO Lasix 40 mg BID.

## 2021-02-22 NOTE — DISCHARGE NOTE PROVIDER - NSDCFUSCHEDAPPT_GEN_ALL_CORE_FT
NAILA ALFREDO ; 03/11/2021 ; NPP CT Surg 300 Comm NAILA Gaffney ; 05/11/2021 ; NPP Cardio Electro 300 Comm

## 2021-02-22 NOTE — PROGRESS NOTE ADULT - ASSESSMENT
89F w/ hx of anemia, HTN, HLD, ASHD, carotid stenosis, MR and severe aortic stenosis s/p tavr 1/27/2021, s/p pacemaker 1/31/2021 presenting from home with RIVAS x 4 days. Patient reports she has been having increasing SOB over the last 4 days, worse with exertion. Patient reports she began having SOB at rest yesterday evening which prompted her to go to the ED for evaluation. Patient denies fever/chills, chest pain, N/V/D/C. Patient denies sick contacts or recent COVID exposure.    Decompensated HFpEF (moderate LV concentric hypertrophy)  -Likely 2/2 to inadequate diuresis and missed doses of Lasix.  -Presenting with VOL, bibasilar crackles; diuresis with Lasix   -transition to po lasix 40 mg BID   -Continue GDMT: Toprol XL 50mg PO daily, can uptitrate to 75 mg po QD  -She is in NSR, .   -Strict ins and outs and daily weights.  -Replete lytes PRN, Mg=2, Phos=3, and K=4.  -2g Na restricted diet.  -Continue telemetry monitoring.    HTN  - continue Norvasc 2.5mg PO daily.    ASCVD risk  -Can continue ASA 81mg PO daily  -Lipitor 20mg PO daily.    ? h/o afib  - c/w Eliquis    anemia  - iron studies    dvt px  - on eliquis    PT eval  - cleared by cardio for d/c

## 2021-02-22 NOTE — DISCHARGE NOTE PROVIDER - CARE PROVIDER_API CALL
Neal Ocampo (DO)  Cardiovascular Disease; Internal Medicine; Interventional Cardiology  [address], [address]  [place] [address]  Phone: [phone]  Fax: [phone]  Follow Up Time:     KAILA KWOK  [id number] [address] [place] [address]  Phone: [phone]  Fax: [phone]  Follow Up Time:    Neal Ocampo (DO)  Cardiovascular Disease; Internal Medicine; Interventional Cardiology  [address], [address]  [place] [address]  Phone: [phone]  Fax: [phone]  Follow Up Time: 1 week    KAILA KWOK  [id number]  [address] [place] [address]  Phone: [phone]  Fax: [phone]  Follow Up Time:

## 2021-02-22 NOTE — PROGRESS NOTE ADULT - SUBJECTIVE AND OBJECTIVE BOX
DATE OF SERVICE: 02-22-21 @ 13:39    Patient is a 89y old  Female who presents with a chief complaint of SOB (22 Feb 2021 11:13)      SUBJECTIVE / OVERNIGHT EVENTS: No chest pain. No shortness of breath. No complaints. No events overnight.     MEDICATIONS  (STANDING):  amLODIPine   Tablet 2.5 milliGRAM(s) Oral daily  apixaban 2.5 milliGRAM(s) Oral two times a day  aspirin enteric coated 81 milliGRAM(s) Oral daily  atorvastatin 20 milliGRAM(s) Oral at bedtime  furosemide   Injectable 40 milliGRAM(s) IV Push two times a day  latanoprost 0.005% Ophthalmic Solution 1 Drop(s) Both EYES at bedtime  metoprolol succinate ER 50 milliGRAM(s) Oral daily  montelukast 10 milliGRAM(s) Oral daily    MEDICATIONS  (PRN):      Vital Signs Last 24 Hrs  T(C): 36.4 (22 Feb 2021 12:27), Max: 36.5 (22 Feb 2021 04:11)  T(F): 97.6 (22 Feb 2021 12:27), Max: 97.7 (22 Feb 2021 04:11)  HR: 72 (22 Feb 2021 12:27) (72 - 89)  BP: 115/79 (22 Feb 2021 12:27) (115/79 - 155/72)  BP(mean): 105 (22 Feb 2021 04:11) (105 - 105)  RR: 18 (22 Feb 2021 12:27) (18 - 18)  SpO2: 97% (22 Feb 2021 12:27) (97% - 97%)  CAPILLARY BLOOD GLUCOSE        I&O's Summary    21 Feb 2021 07:01  -  22 Feb 2021 07:00  --------------------------------------------------------  IN: 240 mL / OUT: 950 mL / NET: -710 mL    22 Feb 2021 07:01  -  22 Feb 2021 13:39  --------------------------------------------------------  IN: 255 mL / OUT: 890 mL / NET: -635 mL        PHYSICAL EXAM:  GENERAL: NAD, well-developed  HEAD:  Atraumatic, Normocephalic  EYES: EOMI, PERRLA, conjunctiva and sclera clear  NECK: Supple, No JVD  CHEST/LUNG: Clear to auscultation bilaterally; No wheeze  HEART: Regular rate and rhythm; No murmurs, rubs, or gallops  ABDOMEN: Soft, Nontender, Nondistended; Bowel sounds present  EXTREMITIES:  2+ Peripheral Pulses, No clubbing, cyanosis, or edema  PSYCH: AAOx3  NEUROLOGY: non-focal  SKIN: No rashes or lesions    LABS:                        10.4   6.09  )-----------( 156      ( 22 Feb 2021 07:50 )             32.7     02-22    136  |  97  |  40<H>  ----------------------------<  130<H>  4.9   |  28  |  1.21    Ca    9.4      22 Feb 2021 07:50  Phos  4.6     02-22  Mg     2.1     02-22                RADIOLOGY & ADDITIONAL TESTS:    Imaging Personally Reviewed:    Consultant(s) Notes Reviewed:      Care Discussed with Consultants/Other Providers:

## 2021-02-22 NOTE — DISCHARGE NOTE NURSING/CASE MANAGEMENT/SOCIAL WORK - PATIENT PORTAL LINK FT
You can access the FollowMyHealth Patient Portal offered by [facility] by registering at the following website: [email or web address]. By joining [employer]’s FollowMyHealth portal, you will also be able to view your health information using other applications (apps) compatible with our system.

## 2021-02-22 NOTE — PROGRESS NOTE ADULT - SUBJECTIVE AND OBJECTIVE BOX
Patient seen and examined at bedside.    Overnight Events:   Feels well, no cp or shortness of breath     REVIEW OF SYSTEMS:  Constitutional:     [x ] negative [ ] fevers [ ] chills [ ] weight loss [ ] weight gain  HEENT:                  [x ] negative [ ] dry eyes [ ] eye irritation [ ] postnasal drip [ ] nasal congestion  CV:                         [ x] negative  [ ] chest pain [ ] orthopnea [ ] palpitations [ ] murmur  Resp:                     [ x] negative [ ] cough [ ] shortness of breath [ ] dyspnea [ ] wheezing [ ] sputum [ ]hemoptysis  GI:                          [ x] negative [ ] nausea [ ] vomiting [ ] diarrhea [ ] constipation [ ] abd pain [ ] dysphagia   :                        [ x] negative [ ] dysuria [ ] nocturia [ ] hematuria [ ] increased urinary frequency  Musculoskeletal: [ x] negative [ ] back pain [ ] myalgias [ ] arthralgias [ ] fracture  Skin:                       [ x] negative [ ] rash [ ] itch  Neurological:        [x ] negative [ ] headache [ ] dizziness [ ] syncope [ ] weakness [ ] numbness  Psychiatric:           [ x] negative [ ] anxiety [ ] depression  Endocrine:            [ x] negative [ ] diabetes [ ] thyroid problem  Heme/Lymph:      [ x] negative [ ] anemia [ ] bleeding problem  Allergic/Immune: [ x] negative [ ] itchy eyes [ ] nasal discharge [ ] hives [ ] angioedema    [ x] All other systems negative  [ ] Unable to assess ROS due to    Current Meds:  amLODIPine   Tablet 2.5 milliGRAM(s) Oral daily  apixaban 2.5 milliGRAM(s) Oral two times a day  aspirin enteric coated 81 milliGRAM(s) Oral daily  atorvastatin 20 milliGRAM(s) Oral at bedtime  furosemide   Injectable 40 milliGRAM(s) IV Push two times a day  latanoprost 0.005% Ophthalmic Solution 1 Drop(s) Both EYES at bedtime  metoprolol succinate ER 50 milliGRAM(s) Oral daily  montelukast 10 milliGRAM(s) Oral daily      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        Vitals:  T(F): 97.7 (02-22), Max: 97.7 (02-22)  HR: 86 (02-22) (69 - 89)  BP: 148/86 (02-22) (123/86 - 155/72)  RR: 18 (02-22)  SpO2: 97% (02-22)  I&O's Summary    21 Feb 2021 07:01  -  22 Feb 2021 07:00  --------------------------------------------------------  IN: 240 mL / OUT: 950 mL / NET: -710 mL    22 Feb 2021 07:01  -  22 Feb 2021 07:29  --------------------------------------------------------  IN: 0 mL / OUT: 400 mL / NET: -400 mL        Physical Exam:  GENERAL: No acute distress, well-developed  HEAD:  Atraumatic, Normocephalic  ENT: conjunctiva and sclera clear, Neck supple, moist mucosa  CHEST/LUNG: Moderate crackles in LE b/l  HEART: Regular rate and rhythm; No rubs, or gallops  ABDOMEN: Soft, Nontender, Nondistended  EXTREMITIES: L>R LE edema (chronic)  PSYCH: Nl behavior, nl affect  NEUROLOGY: AAOx3, non-focal  SKIN: Normal color, No rashes or lesions                          9.6    6.71  )-----------( 177      ( 20 Feb 2021 10:08 )             31.0     02-21    138  |  103  |  40<H>  ----------------------------<  111<H>  5.3   |  24  |  1.01    Ca    9.4      21 Feb 2021 07:34  Phos  4.1     02-21  Mg     2.1     02-21    TPro  7.2  /  Alb  4.0  /  TBili  0.4  /  DBili  x   /  AST  26  /  ALT  19  /  AlkPhos  62  02-20    PT/INR - ( 20 Feb 2021 10:08 )   PT: 21.3 sec;   INR: 1.83 ratio         PTT - ( 20 Feb 2021 10:08 )  PTT:34.3 sec      Serum Pro-Brain Natriuretic Peptide: 5598 pg/mL (02-20 @ 10:08)          New ECG(s): Personally reviewed    Echo: Personally reviewed    Stress Testing: Personally reviewed    Cath: Personally reviewed    Imaging: Personally reviewed     Patient seen and examined at bedside.    Overnight Events:   Feels well, no cp or shortness of breath       REVIEW OF SYSTEMS:  Constitutional:     [x ] negative [ ] fevers [ ] chills [ ] weight loss [ ] weight gain  HEENT:                  [x ] negative [ ] dry eyes [ ] eye irritation [ ] postnasal drip [ ] nasal congestion  CV:                         [ x] negative  [ ] chest pain [ ] orthopnea [ ] palpitations [ ] murmur  Resp:                     [ x] negative [ ] cough [ ] shortness of breath [ ] dyspnea [ ] wheezing [ ] sputum [ ]hemoptysis  GI:                          [ x] negative [ ] nausea [ ] vomiting [ ] diarrhea [ ] constipation [ ] abd pain [ ] dysphagia   :                        [ x] negative [ ] dysuria [ ] nocturia [ ] hematuria [ ] increased urinary frequency  Musculoskeletal: [ x] negative [ ] back pain [ ] myalgias [ ] arthralgias [ ] fracture  Skin:                       [ x] negative [ ] rash [ ] itch  Neurological:        [x ] negative [ ] headache [ ] dizziness [ ] syncope [ ] weakness [ ] numbness  Psychiatric:           [ x] negative [ ] anxiety [ ] depression  Endocrine:            [ x] negative [ ] diabetes [ ] thyroid problem  Heme/Lymph:      [ x] negative [ ] anemia [ ] bleeding problem  Allergic/Immune: [ x] negative [ ] itchy eyes [ ] nasal discharge [ ] hives [ ] angioedema  [ x] All other systems negative    Current Meds:  amLODIPine   Tablet 2.5 milliGRAM(s) Oral daily  apixaban 2.5 milliGRAM(s) Oral two times a day  aspirin enteric coated 81 milliGRAM(s) Oral daily  atorvastatin 20 milliGRAM(s) Oral at bedtime  furosemide   Injectable 40 milliGRAM(s) IV Push two times a day  latanoprost 0.005% Ophthalmic Solution 1 Drop(s) Both EYES at bedtime  metoprolol succinate ER 50 milliGRAM(s) Oral daily  montelukast 10 milliGRAM(s) Oral daily      PAST MEDICAL & SURGICAL HISTORY:  History of varicose veins of lower extremity  Glaucoma  HLD (hyperlipidemia)  Carotid stenosis  Hypertension  Mitral regurgitation  Aortic stenosis  appendectomy  bilateral hip replacements      Vitals:  T(F): 97.7 (02-22), Max: 97.7 (02-22)  HR: 86 (02-22) (69 - 89)  BP: 148/86 (02-22) (123/86 - 155/72)  RR: 18 (02-22)  SpO2: 97% (02-22)    Physical Exam:  GENERAL: No acute distress, well-developed  HEAD:  Atraumatic, Normocephalic  ENT: conjunctiva and sclera clear, Neck supple, moist mucosa  CHEST/LUNG: Moderate crackles in LE b/l  HEART: Regular rate and rhythm; No rubs, or gallops  ABDOMEN: Soft, Nontender, Nondistended  EXTREMITIES: L>R LE edema (chronic)  PSYCH: Nl behavior, nl affect  NEUROLOGY: AAOx3, non-focal  SKIN: Normal color, No rashes or lesions      I&O's Summary  21 Feb 2021 07:01  -  22 Feb 2021 07:00  --------------------------------------------------------  IN: 240 mL / OUT: 950 mL / NET: -710 mL    22 Feb 2021 07:01  -  22 Feb 2021 07:29  --------------------------------------------------------  IN: 0 mL / OUT: 400 mL / NET: -400 mL        LABS:                     9.6    6.71  )-----------( 177      ( 20 Feb 2021 10:08 )             31.0     02-21  138  |  103  |  40<H>  ----------------------------<  111<H>  5.3   |  24  |  1.01    Ca    9.4      21 Feb 2021 07:34  Phos  4.1     02-21  Mg     2.1     02-21    TPro  7.2  /  Alb  4.0  /  TBili  0.4  /  DBili  x   /  AST  26  /  ALT  19  /  AlkPhos  62  02-20    PT/INR - ( 20 Feb 2021 10:08 )   PT: 21.3 sec;   INR: 1.83 ratio    PTT - ( 20 Feb 2021 10:08 )  PTT:34.3 sec    Serum Pro-Brain Natriuretic Peptide: 5598 pg/mL (02-20 @ 10:08)

## 2021-02-22 NOTE — DISCHARGE NOTE PROVIDER - PROVIDER TOKENS
PROVIDER:[TOKEN:[[id number]]],PROVIDER:[TOKEN:[[id number]]] PROVIDER:[TOKEN:[[id number]],FOLLOWUP:[1 week]],PROVIDER:[TOKEN:[[id number]]]

## 2021-02-23 ENCOUNTER — NON-APPOINTMENT (OUTPATIENT)
Age: 86
End: 2021-02-23

## 2021-02-23 RX ORDER — METOPROLOL SUCCINATE 50 MG/1
50 TABLET, EXTENDED RELEASE ORAL
Qty: 30 | Refills: 0 | Status: ACTIVE | COMMUNITY

## 2021-02-23 RX ORDER — APIXABAN 2.5 MG/1
2.5 TABLET, FILM COATED ORAL
Qty: 60 | Refills: 3 | Status: ACTIVE | COMMUNITY
Start: 2021-02-23

## 2021-02-23 RX ORDER — IRON/C/B12/CALCIU/STOMACH CONC 70-150-10
TABLET ORAL DAILY
Refills: 0 | Status: ACTIVE | COMMUNITY

## 2021-02-23 RX ORDER — TRAVOPROST 0.04 MG/ML
0 SOLUTION OPHTHALMIC DAILY
Refills: 0 | Status: ACTIVE | COMMUNITY
Start: 2021-02-03

## 2021-02-23 RX ORDER — AMLODIPINE BESYLATE 2.5 MG/1
2.5 TABLET ORAL DAILY
Refills: 0 | Status: ACTIVE | COMMUNITY
Start: 2021-02-03

## 2021-02-23 RX ORDER — MONTELUKAST 10 MG/1
10 TABLET, FILM COATED ORAL DAILY
Refills: 0 | Status: ACTIVE | COMMUNITY
Start: 2021-02-03

## 2021-02-23 RX ORDER — FUROSEMIDE 40 MG/1
40 TABLET ORAL TWICE DAILY
Refills: 0 | Status: ACTIVE | COMMUNITY
Start: 2021-02-09

## 2021-02-23 RX ORDER — ACETAMINOPHEN 325 MG/1
325 TABLET ORAL EVERY 8 HOURS
Refills: 0 | Status: ACTIVE | COMMUNITY
Start: 2021-02-03

## 2021-02-23 RX ORDER — ATORVASTATIN CALCIUM 20 MG/1
20 TABLET, FILM COATED ORAL
Qty: 1 | Refills: 3 | Status: ACTIVE | COMMUNITY

## 2021-02-24 ENCOUNTER — APPOINTMENT (OUTPATIENT)
Dept: CARE COORDINATION | Facility: HOME HEALTH | Age: 86
End: 2021-02-24

## 2021-02-24 VITALS
HEART RATE: 80 BPM | WEIGHT: 192 LBS | OXYGEN SATURATION: 96 % | BODY MASS INDEX: 35.12 KG/M2 | TEMPERATURE: 97 F | SYSTOLIC BLOOD PRESSURE: 118 MMHG | DIASTOLIC BLOOD PRESSURE: 54 MMHG | RESPIRATION RATE: 20 BRPM

## 2021-02-24 NOTE — REVIEW OF SYSTEMS
[As Noted in HPI] : as noted in HPI [Lower Ext Edema] : lower extremity edema [Negative] : Heme/Lymph [FreeTextEntry5] : Chronic BLE edema [de-identified] : No rashes.

## 2021-02-24 NOTE — ASSESSMENT
[FreeTextEntry1] : Overweight, elderly female stable after recent hospitalization for decompensated heart failure.\par \par Problems:\par 1. HFpEF, control not optimized \par Consider changing from Lasix 40 mg po bid to Torsemide 20 mg po bid\par Would f/u with BMP to monitor renal function/K+ level\par Message left to d/w pt.'s cardiologist prior to initiating any med changes\par c/w daily weights\par c/w Lasix 40 mg po bid for now\par Monitor for cardiopulmonary decompensation\par Daughter able to verbalize understanding of present condition & POC>\par \par 2. Afib, rate controlled/HTN/PPM\par c/w Metoprolol Succinate 50 mg po qd\par c/w Amlodipine 2.5 mg po qd\par c/w Crestor 20 mg po qhs\par c/w Eliquis 2.5 mg po bid\par c/w ASA 81 mg po qd\par Monitor for bleeding/bruising\par PPM checks as per cardiology\par \par 3. AS, s/p TAVR\par f/u echo due on 3/11/21

## 2021-02-24 NOTE — PHYSICAL EXAM
[Sclera] : the sclera and conjunctiva were normal [PERRL With Normal Accommodation] : pupils were equal in size, round, and reactive to light [Extraocular Movements] : extraocular movements were intact [Neck Appearance] : the appearance of the neck was normal [Jugular Venous Distention Increased] : there was no jugular-venous distention [] : no respiratory distress [Respiration, Rhythm And Depth] : normal respiratory rhythm and effort [Exaggerated Use Of Accessory Muscles For Inspiration] : no accessory muscle use [Bibasilar Rales/Crackles] : bibasilar rales [Examination Of The Chest] : the chest was normal in appearance [Fingers] :  capillary refill of the fingers was normal [Bowel Sounds] : normal bowel sounds [Abdomen Soft] : soft [Urinary Bladder Findings] : the bladder was normal on palpation [Nail Clubbing] : no clubbing  or cyanosis of the fingernails [Skin Color & Pigmentation] : normal skin color and pigmentation [Skin Turgor] : normal skin turgor [FreeTextEntry1] : Small intact blister noted to RLE/shin [No Focal Deficits] : no focal deficits [Oriented To Time, Place, And Person] : oriented to person, place, and time [Impaired Insight] : insight and judgment were intact [Affect] : the affect was normal [Mood] : the mood was normal

## 2021-02-24 NOTE — HISTORY OF PRESENT ILLNESS
[FreeTextEntry1] : As per EMR:\par \par Hospital Course: 89F w/ hx of anemia, HTN, HLD, ASHD, carotid stenosis, MR and severe aortic stenosis s/p TAVR. 1/27/2021, s/p pacemaker 1/31/2021 presenting from home with RIVAS x 4 days. Patient reports she has been having increasing SOB over the last 4 days, worse with exertion. Patient reports she began having SOB at rest yesterday evening which prompted her to go to the ED for evaluation. Patient denies fever/chills, chest pain, N/V/D/C. Patient denies sick contacts or recent COVID exposure.\par Decompensated HFpEF. Pt was on Lasix 40 mg IV BID. Pt is A. Fib. at rate of 70s - 90s, on Eliquis and Toprol. \par Pt is stable now and will be discharged with PO Lasix 40 mg BID.\par \par Pt. was discharged home on 2/22/21. She is seen & examined in her home today in routine f/u.\par According to the pt.'s daughter pt was noted to be in a-fib upon last cardiology exam and started on Eliquis. This was prior to recent hospitalization. Daughter states that pt.'s weight was 189 lb after d/c & is 192 lbs today. Prior to hospitalization she weighed 197 lbs & seemed to do well at 194 lbs in the past.\par

## 2021-02-25 ENCOUNTER — NON-APPOINTMENT (OUTPATIENT)
Age: 86
End: 2021-02-25

## 2021-03-04 ENCOUNTER — TRANSCRIPTION ENCOUNTER (OUTPATIENT)
Age: 86
End: 2021-03-04

## 2021-03-11 ENCOUNTER — APPOINTMENT (OUTPATIENT)
Dept: CARDIOTHORACIC SURGERY | Facility: CLINIC | Age: 86
End: 2021-03-11
Payer: MEDICARE

## 2021-03-11 ENCOUNTER — NON-APPOINTMENT (OUTPATIENT)
Age: 86
End: 2021-03-11

## 2021-03-11 ENCOUNTER — OUTPATIENT (OUTPATIENT)
Dept: OUTPATIENT SERVICES | Facility: HOSPITAL | Age: 86
LOS: 1 days | End: 2021-03-11
Payer: MEDICARE

## 2021-03-11 VITALS
OXYGEN SATURATION: 99 % | DIASTOLIC BLOOD PRESSURE: 54 MMHG | SYSTOLIC BLOOD PRESSURE: 120 MMHG | TEMPERATURE: 98.1 F | HEART RATE: 70 BPM | BODY MASS INDEX: 34.78 KG/M2 | RESPIRATION RATE: 14 BRPM | HEIGHT: 62 IN | WEIGHT: 189 LBS

## 2021-03-11 DIAGNOSIS — Z90.49 ACQUIRED ABSENCE OF OTHER SPECIFIED PARTS OF DIGESTIVE TRACT: Chronic | ICD-10-CM

## 2021-03-11 DIAGNOSIS — Z95.3 PRESENCE OF XENOGENIC HEART VALVE: ICD-10-CM

## 2021-03-11 DIAGNOSIS — I35.0 NONRHEUMATIC AORTIC (VALVE) STENOSIS: ICD-10-CM

## 2021-03-11 DIAGNOSIS — Z96.60 PRESENCE OF UNSPECIFIED ORTHOPEDIC JOINT IMPLANT: Chronic | ICD-10-CM

## 2021-03-11 PROCEDURE — 93306 TTE W/DOPPLER COMPLETE: CPT | Mod: 26

## 2021-03-11 PROCEDURE — 99072 ADDL SUPL MATRL&STAF TM PHE: CPT

## 2021-03-11 PROCEDURE — 93306 TTE W/DOPPLER COMPLETE: CPT

## 2021-03-11 PROCEDURE — 99214 OFFICE O/P EST MOD 30 MIN: CPT

## 2021-03-11 PROCEDURE — 93000 ELECTROCARDIOGRAM COMPLETE: CPT

## 2021-03-12 NOTE — REVIEW OF SYSTEMS
[Feeling Fatigued] : feeling fatigued [Negative] : Heme/Lymph [Change in Appetite] : no change in appetite

## 2021-03-12 NOTE — HISTORY OF PRESENT ILLNESS
[FreeTextEntry1] : NAILA ALFREDO is a 89 year old female here on 03/11/2021, 6 weeks after undergoing a transfemoral transcatheter aortic valve implantation using a 29 mm Evolut Pro Plus bioprosthesis on 1/27/2021.  Post-operative course was significant for bradycardia and placement of PPM on 1/31/2021, anemia requiring PRBC infusion, and FELICITAS for which renal was consulted. Discharge BUN/Creat was 55/1.45. She was discharged on POD #6. MCOT was placed on 1/29 but only monitored for 5 hours while still hospitalized. She received a PPM on 1/31/2021.\par \par She  comes to the office today with a new TTE reporting feeling "OK" overall. She feels about the same as she did  prior to surgery as far as functionally. Prior to the CLAUDIA she would have episodes where she couldn't breath and shortness of breath. Now, she makes the bed and has to stop due to fatigue but no shortness of breath. She currently denies fever, chills, cough, palpitations, angina, dyspnea, dizziness, lightheadedness, or syncope. She gets swelling in the left foot and ankle which is improved. Her energy level is low and appetite is getting better. Denies bowel or bladder problems. Groins are healed, no problems. She does get some pain in the lower back at times. She walks around the house for exercise. She gets PT at home 2x a week and the nurse comes 2x a week but the nursing visits are coming to an end. \par \par She was admitted to the hospital on 2/20/2021 for shortness of breath x 4 days and decompensated HFpEF. She was given Lasix 40 mg IV BID and was in AF in the 70's. She stabilized and was discharged on 2/22/2021 on Lasix 40 mg po BID. \par \par 5 meter walk: 8.32 / 10.24 / ----\par NYHA II\par PCP: Dr. Rivera\par CARD: Dr. Ocampo (appt. next week)\par EP: Dr. Lauri Dupree\par \par \par \par \par \par \par

## 2021-03-12 NOTE — PHYSICAL EXAM
[General Appearance - Well Developed] : well developed [Normal Appearance] : normal appearance [Well Groomed] : well groomed [General Appearance - Well Nourished] : well nourished [No Deformities] : no deformities [General Appearance - In No Acute Distress] : no acute distress [Normal Conjunctiva] : the conjunctiva exhibited no abnormalities [Normal Oral Mucosa] : normal oral mucosa [No Oral Pallor] : no oral pallor [No Oral Cyanosis] : no oral cyanosis [Normal Jugular Venous A Waves Present] : normal jugular venous A waves present [Normal Jugular Venous V Waves Present] : normal jugular venous V waves present [No Jugular Venous Painting A Waves] : no jugular venous painting A waves [Respiration, Rhythm And Depth] : normal respiratory rhythm and effort [Exaggerated Use Of Accessory Muscles For Inspiration] : no accessory muscle use [Auscultation Breath Sounds / Voice Sounds] : lungs were clear to auscultation bilaterally [Normal Rate] : normal [Heart Rate ___] : [unfilled] bpm [Rhythm Regular] : regular [Normal S1] : normal S1 [Normal S2] : normal S2 [No Gallop] : no gallop heard [Prosthetic Aortic Valve] : prosthetic aortic valve heard [II] : a grade 2 [2+] : left 2+ [___ +] : bilateral [unfilled]U+ pitting edema to the ankles [Bowel Sounds] : normal bowel sounds [Abdomen Soft] : soft [Abdomen Tenderness] : non-tender [Nail Clubbing] : no clubbing of the fingernails [Cyanosis, Localized] : no localized cyanosis [Petechial Hemorrhages (___cm)] : no petechial hemorrhages [Skin Color & Pigmentation] : normal skin color and pigmentation [Skin Turgor] : normal skin turgor [] : no rash [No Venous Stasis] : no venous stasis [Oriented To Time, Place, And Person] : oriented to person, place, and time [Impaired Insight] : insight and judgment were intact [Affect] : the affect was normal [Mood] : the mood was normal [Memory Recent] : recent memory was not impaired [Memory Remote] : remote memory was not impaired [No Anxiety] : not feeling anxious [Click] : no click [Distant] : the heart sounds were ~L not distant [Pericardial Rub] : no pericardial rub [Right Carotid Bruit] : no bruit heard over the right carotid [Left Carotid Bruit] : no bruit heard over the left carotid [FreeTextEntry1] : comesm to office is wheelchair; uses walker to ambulate. Gait slow and slightly unsteady w/o walker (did not bring today) with valgus knee deformity

## 2021-03-12 NOTE — REASON FOR VISIT
[Family Member] : family member [FreeTextEntry1] : 1/27/2021 CLAUDIA using a 29 mm Evolut Pro Plus bioprosthesis

## 2021-03-12 NOTE — ADDENDUM
[FreeTextEntry1] : 3/12/2021 @ 1630: TTE and visit reviewed with Dr. Ulrich. TTE stable and valve functioning well. Follow-up with Dr. Ocampo as scheduled (next week) and with Dr. Ulrich yearly with TTE.  Called daniel Mcleod at [phone] and on her cell at [phone] and left a message regarding above. Call back if questions.

## 2021-03-12 NOTE — DISCUSSION/SUMMARY
[FreeTextEntry1] : Plan:\par 1) Doing well 6 weeks s/p CLAUDIA (#29 Evolut Pro Plus bioprosthesis) s/p [facility] admission for decompensated HF 2/20-2/22 now on p.o. Lasix 40 mg BID.  \par 2) BP at goal - Medication changes: none\par 3) TTE done today - results pending. Will review with Dr. Ulrich and call pt. with results. \par 4) To follow-up with cardiology as scheduled next week (Dr. Ocampo)\par 5) To follow-up with PCP as scheduled\par 6) Follow-up with EP as scheduled\par 7) To follow-up with structural heart clinic yearly with TTE or sooner if needed.\par 8) Antibiotic prophylaxis discussed\par 9) Encouraged COVID vaccination\par

## 2021-03-15 LAB
ANION GAP SERPL CALC-SCNC: 16 MMOL/L
BASOPHILS # BLD AUTO: 0.04 K/UL
BASOPHILS NFR BLD AUTO: 0.6 %
BUN SERPL-MCNC: 63 MG/DL
CALCIUM SERPL-MCNC: 10 MG/DL
CHLORIDE SERPL-SCNC: 95 MMOL/L
CO2 SERPL-SCNC: 28 MMOL/L
CREAT SERPL-MCNC: 1.46 MG/DL
EOSINOPHIL # BLD AUTO: 0.25 K/UL
EOSINOPHIL NFR BLD AUTO: 4 %
GLUCOSE SERPL-MCNC: 110 MG/DL
HCT VFR BLD CALC: 31.6 %
HGB BLD-MCNC: 9.9 G/DL
IMM GRANULOCYTES NFR BLD AUTO: 0.2 %
LYMPHOCYTES # BLD AUTO: 1.35 K/UL
LYMPHOCYTES NFR BLD AUTO: 21.4 %
MAN DIFF?: NORMAL
MCHC RBC-ENTMCNC: 28.7 PG
MCHC RBC-ENTMCNC: 31.3 GM/DL
MCV RBC AUTO: 91.6 FL
MONOCYTES # BLD AUTO: 0.84 K/UL
MONOCYTES NFR BLD AUTO: 13.3 %
NEUTROPHILS # BLD AUTO: 3.83 K/UL
NEUTROPHILS NFR BLD AUTO: 60.5 %
PLATELET # BLD AUTO: 167 K/UL
POTASSIUM SERPL-SCNC: 4.8 MMOL/L
RBC # BLD: 3.45 M/UL
RBC # FLD: 13.6 %
SODIUM SERPL-SCNC: 139 MMOL/L
WBC # FLD AUTO: 6.32 K/UL

## 2021-03-16 PROCEDURE — 83605 ASSAY OF LACTIC ACID: CPT

## 2021-03-16 PROCEDURE — 80053 COMPREHEN METABOLIC PANEL: CPT

## 2021-03-16 PROCEDURE — 84295 ASSAY OF SERUM SODIUM: CPT

## 2021-03-16 PROCEDURE — U0003: CPT

## 2021-03-16 PROCEDURE — 84100 ASSAY OF PHOSPHORUS: CPT

## 2021-03-16 PROCEDURE — 76000 FLUOROSCOPY <1 HR PHYS/QHP: CPT

## 2021-03-16 PROCEDURE — 85025 COMPLETE CBC W/AUTO DIFF WBC: CPT

## 2021-03-16 PROCEDURE — 71045 X-RAY EXAM CHEST 1 VIEW: CPT

## 2021-03-16 PROCEDURE — 71046 X-RAY EXAM CHEST 2 VIEWS: CPT

## 2021-03-16 PROCEDURE — 81001 URINALYSIS AUTO W/SCOPE: CPT

## 2021-03-16 PROCEDURE — 86923 COMPATIBILITY TEST ELECTRIC: CPT

## 2021-03-16 PROCEDURE — 83540 ASSAY OF IRON: CPT

## 2021-03-16 PROCEDURE — C1769: CPT

## 2021-03-16 PROCEDURE — 86900 BLOOD TYPING SEROLOGIC ABO: CPT

## 2021-03-16 PROCEDURE — C1892: CPT

## 2021-03-16 PROCEDURE — 83880 ASSAY OF NATRIURETIC PEPTIDE: CPT

## 2021-03-16 PROCEDURE — 82570 ASSAY OF URINE CREATININE: CPT

## 2021-03-16 PROCEDURE — C1894: CPT

## 2021-03-16 PROCEDURE — 84132 ASSAY OF SERUM POTASSIUM: CPT

## 2021-03-16 PROCEDURE — 85730 THROMBOPLASTIN TIME PARTIAL: CPT

## 2021-03-16 PROCEDURE — 85014 HEMATOCRIT: CPT

## 2021-03-16 PROCEDURE — 80048 BASIC METABOLIC PNL TOTAL CA: CPT

## 2021-03-16 PROCEDURE — 85018 HEMOGLOBIN: CPT

## 2021-03-16 PROCEDURE — U0005: CPT

## 2021-03-16 PROCEDURE — L8699: CPT

## 2021-03-16 PROCEDURE — 93005 ELECTROCARDIOGRAM TRACING: CPT

## 2021-03-16 PROCEDURE — C8929: CPT

## 2021-03-16 PROCEDURE — 85610 PROTHROMBIN TIME: CPT

## 2021-03-16 PROCEDURE — 33208 INSRT HEART PM ATRIAL & VENT: CPT | Mod: KX

## 2021-03-16 PROCEDURE — 85027 COMPLETE CBC AUTOMATED: CPT

## 2021-03-16 PROCEDURE — 84443 ASSAY THYROID STIM HORMONE: CPT

## 2021-03-16 PROCEDURE — 97161 PT EVAL LOW COMPLEX 20 MIN: CPT

## 2021-03-16 PROCEDURE — 76770 US EXAM ABDO BACK WALL COMP: CPT

## 2021-03-16 PROCEDURE — 86769 SARS-COV-2 COVID-19 ANTIBODY: CPT

## 2021-03-16 PROCEDURE — 82607 VITAMIN B-12: CPT

## 2021-03-16 PROCEDURE — 83735 ASSAY OF MAGNESIUM: CPT

## 2021-03-16 PROCEDURE — 82947 ASSAY GLUCOSE BLOOD QUANT: CPT

## 2021-03-16 PROCEDURE — 83550 IRON BINDING TEST: CPT

## 2021-03-16 PROCEDURE — P9016: CPT

## 2021-03-16 PROCEDURE — C1785: CPT

## 2021-03-16 PROCEDURE — 83935 ASSAY OF URINE OSMOLALITY: CPT

## 2021-03-16 PROCEDURE — 82803 BLOOD GASES ANY COMBINATION: CPT

## 2021-03-16 PROCEDURE — C1760: CPT

## 2021-03-16 PROCEDURE — C1889: CPT

## 2021-03-16 PROCEDURE — 86901 BLOOD TYPING SEROLOGIC RH(D): CPT

## 2021-03-16 PROCEDURE — 96374 THER/PROPH/DIAG INJ IV PUSH: CPT

## 2021-03-16 PROCEDURE — 99285 EMERGENCY DEPT VISIT HI MDM: CPT | Mod: 25

## 2021-03-16 PROCEDURE — 82962 GLUCOSE BLOOD TEST: CPT

## 2021-03-16 PROCEDURE — 82435 ASSAY OF BLOOD CHLORIDE: CPT

## 2021-03-16 PROCEDURE — 84300 ASSAY OF URINE SODIUM: CPT

## 2021-03-16 PROCEDURE — C1725: CPT

## 2021-03-16 PROCEDURE — 82746 ASSAY OF FOLIC ACID SERUM: CPT

## 2021-03-16 PROCEDURE — 93306 TTE W/DOPPLER COMPLETE: CPT

## 2021-03-16 PROCEDURE — 84484 ASSAY OF TROPONIN QUANT: CPT

## 2021-03-16 PROCEDURE — 84156 ASSAY OF PROTEIN URINE: CPT

## 2021-03-16 PROCEDURE — 86850 RBC ANTIBODY SCREEN: CPT

## 2021-03-16 PROCEDURE — 82330 ASSAY OF CALCIUM: CPT

## 2021-03-16 PROCEDURE — C1887: CPT

## 2021-03-16 PROCEDURE — 82728 ASSAY OF FERRITIN: CPT

## 2021-03-16 PROCEDURE — C1898: CPT

## 2021-03-16 PROCEDURE — 36430 TRANSFUSION BLD/BLD COMPNT: CPT

## 2021-05-11 ENCOUNTER — APPOINTMENT (OUTPATIENT)
Dept: ELECTROPHYSIOLOGY | Facility: CLINIC | Age: 86
End: 2021-05-11

## 2021-06-18 ENCOUNTER — TRANSCRIPTION ENCOUNTER (OUTPATIENT)
Age: 86
End: 2021-06-18

## 2021-08-26 NOTE — H&P PST ADULT - HEIGHT IN INCHES
[FreeTextEntry1] : Available notes, and pertinent labs & imaging in EMR reviewed. Pertinent labs and imaging summarized in HPI.  4

## 2021-09-11 ENCOUNTER — TRANSCRIPTION ENCOUNTER (OUTPATIENT)
Age: 86
End: 2021-09-11

## 2021-10-23 ENCOUNTER — TRANSCRIPTION ENCOUNTER (OUTPATIENT)
Age: 86
End: 2021-10-23

## 2022-02-07 ENCOUNTER — NON-APPOINTMENT (OUTPATIENT)
Age: 87
End: 2022-02-07

## 2022-11-04 NOTE — CONSULT NOTE ADULT - ASSESSMENT
89 year old female with h/o anemia, pre-existing LBBB (QRSd 148ms) and 1st degree AV delay, HTN, HLD, ASHD, carotid stenosis, mild-mod MR and severe AS with c/o progressively worsening dyspnea on exertion who underwent TAVR on 1/27/2021 with post-op progressively widening of LBBB. EPS consulted for pacemaker evaluation.   1) Widening of pre-existing LBBB (QRSd 148ms-->182ms)  2) Severe AS s/p TAVR  -Daily ECG  -Continue to monitor telemetry  -Unsure why patient was started on digoxin >10 yrs ago in [facility], would recommended to discontinue  -Continue with toprol XL 50mg QD  -Will continue to follow patient.    THADDEUS Alvarez, JERI  [id number]
04-Nov-2022 21:29

## 2022-11-21 NOTE — PHYSICAL THERAPY INITIAL EVALUATION ADULT - PREDICTED DURATION OF THERAPY (DAYS/WKS), PT EVAL
Pt tolerated Invega Sustenna IM in the left deltoid today with no adverse reactions  Next apt scheduled  Declined AVS  Left unit ambulatory with a steady gait 
2 weeks

## 2023-09-12 NOTE — DISCHARGE NOTE NURSING/CASE MANAGEMENT/SOCIAL WORK - NSSCTYPOFSERV_GEN_ALL_CORE
Patient said she should wait for the RSV shot for about a year.. Patient is ask for more reasons to why she should wait. She said she think she should have it due to her age.    Please advise  BN   visiting nurse

## 2023-10-31 ENCOUNTER — NON-APPOINTMENT (OUTPATIENT)
Age: 88
End: 2023-10-31

## 2025-05-17 NOTE — PATIENT PROFILE ADULT - VISION (WITH CORRECTIVE LENSES IF THE PATIENT USUALLY WEARS THEM):
Postural dizziness with presyncope glasses/Partially impaired: cannot see medication labels or newsprint, but can see obstacles in path, and the surrounding layout; can count fingers at arm's length